# Patient Record
Sex: MALE | Race: BLACK OR AFRICAN AMERICAN
[De-identification: names, ages, dates, MRNs, and addresses within clinical notes are randomized per-mention and may not be internally consistent; named-entity substitution may affect disease eponyms.]

---

## 2018-04-01 ENCOUNTER — HOSPITAL ENCOUNTER (EMERGENCY)
Dept: HOSPITAL 97 - ER | Age: 23
Discharge: HOME | End: 2018-04-01
Payer: SELF-PAY

## 2018-04-01 DIAGNOSIS — J45.41: Primary | ICD-10-CM

## 2018-04-01 PROCEDURE — 99284 EMERGENCY DEPT VISIT MOD MDM: CPT

## 2018-04-01 PROCEDURE — 96374 THER/PROPH/DIAG INJ IV PUSH: CPT

## 2018-04-01 NOTE — ER
Nurse's Notes                                                                                     

 [facility]                                                                

Name: Miles Verma                                                                             

Age: 22 yrs                                                                                       

Sex: Male                                                                                         

: 1995                                                                                   

MRN: [id number]                                                                                   

Arrival Date: 2018                                                                          

Time: 22:16                                                                                       

Account#: [id number]                                                                            

Bed 8                                                                                             

Private MD:                                                                                       

Diagnosis: Moderate persistent asthma with (acute) exacerbation                                   

                                                                                                  

Presentation:                                                                                     

                                                                                             

22:20 Presenting complaint: EMS states: toned out for report of asthma attack. Transition of  bb  

      care: patient was not received from another setting of care. Onset of symptoms was          

      2018. Care prior to arrival: Medication(s) given: Albuterol Neb x 1, Atrovent     

      Neb x 1.                                                                                    

22:20 Method Of Arrival: EMS: [facility]                                                    bb  

22:20 Acuity: JANAE 2                                                                           bb  

                                                                                                  

Historical:                                                                                       

- Allergies:                                                                                      

22:21 No Known Allergies;                                                                     bb  

- PMHx:                                                                                           

22:21 Asthma;                                                                                 bb  

                                                                                                  

- Immunization history:: Adult Immunizations up to date.                                          

- Social history:: Smoking status: unknown.                                                       

                                                                                                  

                                                                                                  

Screenin:02 Abuse screen: Denies threats or abuse. Nutritional screening: No deficits noted.        ea  

      Tuberculosis screening: No symptoms or risk factors identified. Fall Risk None              

      identified.                                                                                 

                                                                                                  

Assessment:                                                                                       

22:33 General: Appears uncomfortable, Behavior is cooperative. Pain: Denies pain. Neuro:      ea  

      Level of Consciousness is awake, alert, obeys commands, Oriented to person, place,          

      time, situation. Cardiovascular: Heart tones present Patient's skin is warm and dry.        

      Respiratory: Airway is patent Respiratory effort is even, labored, Respiratory pattern      

      is regular, symmetrical. Respiratory: Breath sounds with wheezes bilaterally. GI: No        

      signs and/or symptoms were reported involving the gastrointestinal system. Derm: Skin       

      is pink, warm \T\ dry.                                                                      

23:04 Reassessment: Patient and/or family updated on plan of care and expected duration. Pain ea  

      level reassessed. Patient is alert, oriented x 3, equal unlabored respirations, skin        

      warm/dry/pink. Pt reports he is breathing easier.                                           

23:57 Reassessment: Patient and/or family updated on plan of care and expected duration. Pain ea  

      level reassessed. Patient is alert, oriented x 3, equal unlabored respirations, skin        

      warm/dry/pink. Discharge instructions given to patient, verbalized understanding of         

      instructions.                                                                               

                                                                                                  

Vital Signs:                                                                                      

22:33  / 81; Pulse 60; Resp 22; Temp 98.0(O); Pulse Ox 99% on R/A; Pain 0/10;           ea  

23:57  / 78; Pulse 62; Resp 18 S; Pulse Ox 99% on R/A;                                  ea  

                                                                                                  

ED Course:                                                                                        

22:16 Patient arrived in ED.                                                                  am2 

22:20 Triage completed.                                                                       maria fernanda  

22:21 Jayme Campbell PA is PHCP.                                                               jr8 

22:21 Jonathan Colon MD is Attending Physician.                                                    jr8 

22:21 Arm band placed on Patient placed in an exam room, on a stretcher, on pulse oximetry.   bb  

22:30 Inserted saline lock: 20 gauge in right forearm, using aseptic technique. Blood         ea  

      collected.                                                                                  

22:32 Brandy Lopez, MARCE is Primary Nurse.                                                    ea  

22:33 Patient has correct armband on for positive identification. Bed in low position. Call   ea  

      light in reach. Side rails up X 1.                                                          

23:58 No provider procedures requiring assistance completed. IV discontinued, intact,         ea  

      bleeding controlled, No redness/swelling at site. Pressure dressing applied.                

                                                                                                  

Administered Medications:                                                                         

22:32 Drug: Albuterol 2.5 mg Route: Inhalation;                                               ea  

22:33 Drug: SOLU-Medrol 125 mg Route: IVP; Site: right forearm;                               ea  

23:06 Follow up: Response: No adverse reaction                                                ea  

22:45 Drug: Albuterol 2.5 mg Route: Inhalation;                                               ea  

23:06 Drug: Albuterol 2.5 mg Route: Inhalation;                                               ea  

23:30 Follow up: Response: Marked relief of symptoms                                          ea  

                                                                                                  

                                                                                                  

Outcome:                                                                                          

23:40 Discharge ordered by MD.                                                                jrSherman 

23:58 Discharged to home ambulatory.                                                          ea  

23:58 Condition: improved                                                                         

23:58 Discharge instructions given to patient, Instructed on discharge instructions, follow       

      up and referral plans. medication usage, Demonstrated understanding of instructions,        

      follow-up care, medications, Prescriptions given X 2.                                       

23:59 Patient left the ED.                                                                    ea  

                                                                                                  

Signatures:                                                                                       

Mary Schuler RN RN bb Roszak, Josh, PA PA   jr8                                                  

Audrey Zepeda                               am2                                                  

Brandy Lopez RN RN ea                                                   

                                                                                                  

**************************************************************************************************

## 2018-04-01 NOTE — EDPHYS
Physician Documentation                                                                           

 [facility]                                                                

Name: Miles Verma                                                                             

Age: 22 yrs                                                                                       

Sex: Male                                                                                         

: 1995                                                                                   

MRN: [id number]                                                                                   

Arrival Date: 2018                                                                          

Time: 22:16                                                                                       

Account#: [id number]                                                                            

Bed 8                                                                                             

Private MD:                                                                                       

ED Physician Jonathan Colon                                                                             

HPI:                                                                                              

                                                                                             

22:28 This 22 yrs old Black Male presents to ER via EMS with complaints of Asthma             jr8 

      Exacerbation.                                                                               

22:28 The patient presents to the emergency department with wheezing, Current therapy:        jr8 

      albuterol inhaler. Onset: The symptoms/episode began/occurred acutely, today. Modifying     

      factors: The symptoms are alleviated by nothing, the symptoms are aggravated by             

      exertion, talking. Associated signs and symptoms: The patient has no apparent               

      associated signs or symptoms. Severity of symptoms: At their worst the symptoms were        

      moderate in the emergency department the symptoms are unchanged. The patient has            

      experienced similar episodes in the past, a few times. The patient has not recently         

      seen a physician. Patient stated that he has to use is inhaler about 2-3 times per day.     

      History of asthma. On no other medications. Denies recent illness. Was unable to            

      control shortness of breath with inhaler tonight .                                          

                                                                                                  

Historical:                                                                                       

- Allergies:                                                                                      

22:21 No Known Allergies;                                                                     bb  

- PMHx:                                                                                           

22:21 Asthma;                                                                                 bb  

                                                                                                  

- Immunization history:: Adult Immunizations up to date.                                          

- Social history:: Smoking status: unknown.                                                       

                                                                                                  

                                                                                                  

ROS:                                                                                              

22:28 Eyes: Negative for injury, pain, redness, and discharge, ENT: Negative for injury,      jr8 

      pain, and discharge, Neck: Negative for injury, pain, and swelling, Cardiovascular:         

      Negative for chest pain, palpitations, and edema, Abdomen/GI: Negative for abdominal        

      pain, nausea, vomiting, diarrhea, and constipation, Back: Negative for injury and pain,     

      MS/Extremity: Negative for injury and deformity, Skin: Negative for injury, rash, and       

      discoloration, Neuro: Negative for headache, weakness, numbness, tingling, and seizure.     

22:28 Respiratory: Positive for cough, shortness of breath, wheezing.                             

                                                                                                  

Exam:                                                                                             

22:28 Eyes:  Pupils equal round and reactive to light, extra-ocular motions intact.  Lids and jr8 

      lashes normal.  Conjunctiva and sclera are non-icteric and not injected.  Cornea within     

      normal limits.  Periorbital areas with no swelling, redness, or edema. ENT:  Nares          

      patent. No nasal discharge, no septal abnormalities noted.  Tympanic membranes are          

      normal and external auditory canals are clear.  Oropharynx with no redness, swelling,       

      or masses, exudates, or evidence of obstruction, uvula midline.  Mucous membranes           

      moist. Neck:  Trachea midline, no thyromegaly or masses palpated, and no cervical           

      lymphadenopathy.  Supple, full range of motion without nuchal rigidity, or vertebral        

      point tenderness.  No Meningismus. Cardiovascular:  Regular rate and rhythm with a          

      normal S1 and S2.  No gallops, murmurs, or rubs.  Normal PMI, no JVD.  No pulse             

      deficits. Abdomen/GI:  Soft, non-tender, with normal bowel sounds.  No distension or        

      tympany.  No guarding or rebound.  No evidence of tenderness throughout. Back:  No          

      spinal tenderness.  No costovertebral tenderness.  Full range of motion. Skin:  Warm,       

      dry with normal turgor.  Normal color with no rashes, no lesions, and no evidence of        

      cellulitis. MS/ Extremity:  Pulses equal, no cyanosis.  Neurovascular intact.  Full,        

      normal range of motion. Neuro:  Awake and alert, GCS 15, oriented to person, place,         

      time, and situation.  Cranial nerves II-XII grossly intact.  Motor strength 5/5 in all      

      extremities.  Sensory grossly intact.  Cerebellar exam normal.  Normal gait.                

22:28 Respiratory: mild respiratory distress is noted,  Respirations: tachypnea, that is          

      mild, Breath sounds: wheezing: expiratory that is moderate, is heard diffusely.             

                                                                                                  

Vital Signs:                                                                                      

22:33  / 81; Pulse 60; Resp 22; Temp 98.0(O); Pulse Ox 99% on R/A; Pain 0/10;           ea  

23:57  / 78; Pulse 62; Resp 18 S; Pulse Ox 99% on R/A;                                  ea  

                                                                                                  

MDM:                                                                                              

22:21 Patient medically screened.                                                             8 

23:40 Data reviewed: vital signs, nurses notes, and as a result, I will discharge patient.    jr8 

      Data interpreted: Pulse oximetry: on room air is 99 %. Interpretation: normal.              

      Counseling: I had a detailed discussion with the patient and/or guardian regarding: the     

      historical points, exam findings, and any diagnostic results supporting the                 

      discharge/admit diagnosis, the need for outpatient follow up, a family practitioner, to     

      return to the emergency department if symptoms worsen or persist or if there are any        

      questions or concerns that arise at home. Response to treatment: the patient's symptoms     

      have resolved after treatment.                                                              

                                                                                                  

                                                                                             

22:21 Order name: IV; Complete Time: 22:33                                                    jr8 

                                                                                                  

Administered Medications:                                                                         

22:32 Drug: Albuterol 2.5 mg Route: Inhalation;                                               ea  

22:33 Drug: SOLU-Medrol 125 mg Route: IVP; Site: right forearm;                               ea  

23:06 Follow up: Response: No adverse reaction                                                ea  

22:45 Drug: Albuterol 2.5 mg Route: Inhalation;                                               ea  

23:06 Drug: Albuterol 2.5 mg Route: Inhalation;                                               ea  

23:30 Follow up: Response: Marked relief of symptoms                                          gera  

                                                                                                  

                                                                                                  

Disposition:                                                                                      

                                                                                             

00:20 Co-signature as Attending Physician, Jonathan Colon MD.                                        tyson 

                                                                                                  

Disposition:                                                                                      

18 23:40 Discharged to Home. Impression: Moderate persistent asthma with (acute)            

  exacerbation.                                                                                   

- Condition is Stable.                                                                            

- Discharge Instructions: Asthma, Adult.                                                          

- Prescriptions for Prednisone 20 mg Oral Tablet - take 1 tablet by ORAL route once               

  daily for 5 days; 5 tablet. Albuterol Sulfate 90 mcg/actuation - inhale 1-2 puff by             

  INHALATION route every 4-6 hours; 1 Inhaler.                                                    

- Medication Reconciliation Form, Thank You Letter, Antibiotic Education, Prescription            

  Opioid Use form.                                                                                

- Follow up: Private Physician; When: 5 - 6 days; Reason: Recheck today's complaints,             

  Continuance of care, Re-evaluation by your physician.                                           

- Problem is new.                                                                                 

- Symptoms have improved.                                                                         

                                                                                                  

                                                                                                  

                                                                                                  

Signatures:                                                                                       

Jonathan Colon MD MD pkl Ballard, Brenda, RN RN bb Roszak, Josh, PA PA   jr8                                                  

Brandy Lopez RN RN ea                                                   

                                                                                                  

**************************************************************************************************

## 2018-04-17 ENCOUNTER — HOSPITAL ENCOUNTER (EMERGENCY)
Dept: HOSPITAL 97 - ER | Age: 23
Discharge: HOME | End: 2018-04-17
Payer: SELF-PAY

## 2018-04-17 DIAGNOSIS — J45.909: ICD-10-CM

## 2018-04-17 DIAGNOSIS — K22.6: Primary | ICD-10-CM

## 2018-04-17 LAB
ALBUMIN SERPL BCP-MCNC: 4.2 G/DL (ref 3.2–5.5)
ALP SERPL-CCNC: 161 IU/L (ref 42–121)
ALT SERPL W P-5'-P-CCNC: 73 IU/L (ref 10–60)
AST SERPL W P-5'-P-CCNC: 46 IU/L (ref 10–42)
BUN BLD-MCNC: 19 MG/DL (ref 6–20)
GLUCOSE SERPLBLD-MCNC: 119 MG/DL (ref 65–120)
HCT VFR BLD CALC: 43.4 % (ref 39.6–49)
LYMPHOCYTES # SPEC AUTO: 1.3 K/UL (ref 0.7–4.9)
MCH RBC QN AUTO: 30.6 PG (ref 27–35)
MCV RBC: 88.4 FL (ref 80–100)
PMV BLD: 9.9 FL (ref 7.6–11.3)
POTASSIUM SERPL-SCNC: 3.6 MEQ/L (ref 3.6–5)
RBC # BLD: 4.91 M/UL (ref 4.33–5.43)

## 2018-04-17 PROCEDURE — 99284 EMERGENCY DEPT VISIT MOD MDM: CPT

## 2018-04-17 PROCEDURE — 80053 COMPREHEN METABOLIC PANEL: CPT

## 2018-04-17 PROCEDURE — 74022 RADEX COMPL AQT ABD SERIES: CPT

## 2018-04-17 PROCEDURE — 36415 COLL VENOUS BLD VENIPUNCTURE: CPT

## 2018-04-17 PROCEDURE — 85025 COMPLETE CBC W/AUTO DIFF WBC: CPT

## 2018-04-17 NOTE — EDPHYS
Physician Documentation                                                                           

 [facility]                                                                

Name: Miles Verma                                                                             

Age: 22 yrs                                                                                       

Sex: Male                                                                                         

: 1995                                                                                   

MRN: [id number]                                                                                   

Arrival Date: 2018                                                                          

Time: 08:49                                                                                       

Account#: [id number]                                                                            

Bed 5                                                                                             

Private MD:                                                                                       

ED Physician Gerald Dawson                                                                      

HPI:                                                                                              

                                                                                             

09:02 This 22 yrs old Black Male presents to ER via Unassigned with complaints of Vomiting.   ps1 

09:02 The patient presents to the emergency department with nausea, vomiting, that is         ps1 

      intermittent, described as blood streaked. Onset: The symptoms/episode began/occurred 2     

      week(s) ago. Possible causes: unknown. The symptoms are aggravated by nothing. The          

      symptoms are alleviated by nothing. Associated signs and symptoms: The patient has no       

      apparent associated signs or symptoms. Severity of symptoms: At their worst the             

      symptoms were moderate in the emergency department the symptoms have resolved. The          

      patient has experienced a previous episode. The patient has been recently seen at the       

      [facility] Emergency Department, for similar complaints. hx of       

      asthma, treated with albuterol. .                                                           

                                                                                                  

Historical:                                                                                       

- Allergies:                                                                                      

09:03 NKA;                                                                                    iw  

- Home Meds:                                                                                      

09:02 Albuterol Inhl [Active];                                                                iw  

- PMHx:                                                                                           

09:02 Asthma;                                                                                 iw  

- PSHx:                                                                                           

09:03 None;                                                                                   iw  

                                                                                                  

- Immunization history:: Adult Immunizations.                                                     

- Social history:: Smoking status: .                                                              

                                                                                                  

                                                                                                  

ROS:                                                                                              

09:02 Constitutional: Negative for fever, chills, and weight loss, Eyes: Negative for injury, ps1 

      pain, redness, and discharge, Neck: Negative for injury, pain, and swelling,                

      Cardiovascular: Negative for chest pain, palpitations, and edema.                           

09:02 Back: Negative for injury and pain, MS/Extremity: Negative for injury and deformity,        

      Skin: Negative for injury, rash, and discoloration, Neuro: Negative for headache,           

      weakness, numbness, tingling, and seizure.                                                  

09:02 Respiratory: Positive for intermittent asthma exacerbation. .                               

09:02 Abdomen/GI: Positive for nausea, vomiting.                                                  

                                                                                                  

Exam:                                                                                             

09:02 Constitutional:  This is a well developed, well nourished patient who is awake, alert,  ps1 

      and in no acute distress. Head/Face:  Normocephalic, atraumatic. Eyes:  Pupils equal        

      round and reactive to light, extra-ocular motions intact.  Lids and lashes normal.          

      Conjunctiva and sclera are non-icteric and not injected. Neck:  Trachea midline, no         

      thyromegaly or masses palpated, and no cervical lymphadenopathy.  Supple, full range of     

      motion without nuchal rigidity, or vertebral point tenderness.  No Meningismus.             

      Chest/axilla:  Normal chest wall appearance and motion.  Nontender with no deformity.       

      No lesions are appreciated. Cardiovascular:  Regular rate and rhythm.  No gallops,          

      murmurs, or rubs.  Normal PMI, no JVD.  No pulse deficits. Respiratory:  Lungs have         

      equal breath sounds bilaterally, clear to auscultation and percussion.  No rales,           

      rhonchi or wheezes noted.  No increased work of breathing, no retractions or nasal          

      flaring. Abdomen/GI:  Soft, non-tender, with normal bowel sounds.  No distension or         

      tympany.  No guarding or rebound.  No evidence of tenderness throughout. MS/ Extremity:     

       Pulses equal, no cyanosis.  Neurovascular intact.  Full, normal range of motion.           

      Neuro:  Awake and alert, GCS 15, oriented to person, place, time, and situation.            

      Cranial nerves II-XII grossly intact. Sensory grossly intact. Psych:  Awake, alert,         

      with orientation to person, place and time.  Behavior, mood, and affect are within          

      normal limits.                                                                              

                                                                                                  

Vital Signs:                                                                                      

09:03  / 76; Pulse 84; Resp 16; Temp 98.2; Pulse Ox 97% on R/A;                         iw  

10:19  / 72; Pulse 72; Resp 16; Pulse Ox 98% on R/A; Pain 0/10;                         sg  

                                                                                                  

MDM:                                                                                              

09:02 Data reviewed: vital signs, nurses notes.                                               ps1 

09:22 Patient medically screened.                                                             ps1 

                                                                                                  

                                                                                             

09:07 Order name: CBC with Diff; Complete Time: 10:13                                         ps1 

                                                                                             

09:07 Order name: CMP; Complete Time: 10:19                                                   ps1 

                                                                                             

09:07 Order name: Abdomen Acute Series XRAY; Complete Time: 10:13                             ps1 

                                                                                                  

Administered Medications:                                                                         

09:20 Drug: Zofran 4 mg Route: PO;                                                            sg  

11:02 Follow up: Response: No adverse reaction                                                iw  

09:20 Drug: GI Cocktail without Donnatal - (Maalox Suspension 30 ml, Lidocaine Liquid 2 % 15  sg  

      ml) Route: PO;                                                                              

11:02 Follow up: Response: No adverse reaction                                                iw  

09:20 Drug: Bentyl 20 mg Route: PO;                                                             

11:01 Follow up: Response: No adverse reaction                                                iw  

                                                                                                  

                                                                                                  

Disposition:                                                                                      

18 10:21 Discharged to Home. Impression: Nausea and vomiting, Shara Thomson Tear.           

- Condition is Stable.                                                                            

- Discharge Instructions: Nausea and Vomiting, Easy-to-Read.                                      

- Prescriptions for Bentyl 10 mg Oral Capsule - take 1 capsule by ORAL route every 6              

  hours As needed; 40 capsule. Carafate 1 gram Oral Tablet - take 1 tablet by ORAL                

  route 4 times per day take on an empty stomach, beginning on waking and last dose at            

  bedtime; 100 tablet. Zofran 4 mg Oral Tablet - take 1 tablet by ORAL route every 12             

  hours As needed; 20 tablet.                                                                     

- Medication Reconciliation Form, Thank You Letter, Antibiotic Education, Prescription            

  Opioid Use form.                                                                                

- Follow up: Private Physician; When: As needed; Reason: Recheck today's complaints,              

  Re-evaluation by your physician. Follow up: Emergency Department; When: As needed;              

  Reason: Worsening of condition.                                                                 

- Problem is an ongoing problem.                                                                  

- Symptoms have improved.                                                                         

                                                                                                  

                                                                                                  

                                                                                                  

Signatures:                                                                                       

Dispatcher [employer]                           Jose F Salguero, RN                         Court Penn RN                     MARCE                                                      

Gerald Dawson MD MD   ps1                                                  

                                                                                                  

**************************************************************************************************

## 2018-04-17 NOTE — ER
Nurse's Notes                                                                                     

 [facility]                                                                

Name: Miles Verma                                                                             

Age: 22 yrs                                                                                       

Sex: Male                                                                                         

: 1995                                                                                   

MRN: [id number]                                                                                   

Arrival Date: 2018                                                                          

Time: 08:49                                                                                       

Account#: [id number]                                                                            

Bed 5                                                                                             

Private MD:                                                                                       

Diagnosis: Nausea and vomiting;Shara Thomson Tear                                                 

                                                                                                  

Presentation:                                                                                     

                                                                                             

09:01 Presenting complaint: Patient states: has been vomiting "red and yellow stuff" for a    iw  

      week. Transition of care: patient was not received from another setting of care. Onset      

      of symptoms was April 10, 2018. Initial Sepsis Screen: Does the patient meet any 2          

      criteria? No. Patient's initial sepsis screen is negative. Does the patient have a          

      suspected source of infection? No. Patient initial sepsis screen negative. Care prior       

      to arrival: None.                                                                           

09:01 Method Of Arrival: Ambulatory                                                           iw  

09: Acuity: JANAE 3                                                                           iw  

                                                                                                  

Historical:                                                                                       

- Allergies:                                                                                      

09:03 NKA;                                                                                    iw  

- Home Meds:                                                                                      

09:02 Albuterol Inhl [Active];                                                                iw  

- PMHx:                                                                                           

09:02 Asthma;                                                                                 iw  

- PSHx:                                                                                           

09:03 None;                                                                                   iw  

                                                                                                  

- Immunization history:: Adult Immunizations.                                                     

- Social history:: Smoking status: .                                                              

                                                                                                  

                                                                                                  

Screenin:15 Abuse screen: Denies threats or abuse. Denies injuries from another. Nutritional        sg  

      screening: No deficits noted. Tuberculosis screening: No symptoms or risk factors           

      identified. Never had TB. Fall Risk None identified.                                        

                                                                                                  

Assessment:                                                                                       

09:15 General: Appears in no apparent distress. comfortable, slender, well groomed, well      sg  

      developed, well nourished, Behavior is calm, cooperative, appropriate for age. Pain:        

      Denies pain. Neuro: No deficits noted. Cardiovascular: Heart tones S1 S2 present            

      Capillary refill is brisk in bilateral fingers Patient's skin is warm and dry. Chest        

      pain is denied. Respiratory: Airway is patent Respiratory effort is even, unlabored,        

      Respiratory pattern is regular, symmetrical, Breath sounds are clear bilaterally. GI:       

      Abdomen is flat, non-distended, Bowel sounds present X 4 quads. Abd is soft and non         

      tender X 4 quads. Reports nausea. : No signs and/or symptoms were reported regarding      

      the genitourinary system. EENT: No signs and/or symptoms were reported regarding the        

      EENT system. Derm: Skin is intact, is healthy with good turgor, Skin is dry, Skin is        

      normal, Skin temperature is warm. Musculoskeletal: No signs and/or symptoms reported        

      regarding the musculoskeletal system.                                                       

10:21 Reassessment: Patient appears in no apparent distress at this time. Patient and/or      sg  

      family updated on plan of care and expected duration. Pain level reassessed. Patient is     

      alert, oriented x 3, equal unlabored respirations, skin warm/dry/pink. pt requesting OJ     

      at this time, awaiting re evaluation by the provider prior to administering anything        

      PO, pt stated understanding, will continue to monitor.                                      

11:01 Reassessment: Patient appears in no apparent distress at this time. Patient and/or      iw  

      family updated on plan of care and expected duration. Pain level reassessed.                

                                                                                                  

Vital Signs:                                                                                      

09:03  / 76; Pulse 84; Resp 16; Temp 98.2; Pulse Ox 97% on R/A;                         iw  

10:19  / 72; Pulse 72; Resp 16; Pulse Ox 98% on R/A; Pain 0/10;                         sg  

                                                                                                  

ED Course:                                                                                        

08:49 Patient arrived in ED.                                                                  as  

08:54 Gerald Dawson MD is Attending Physician.                                             ps1 

09:02 Triage completed.                                                                       iw  

09:03 Arm band placed on.                                                                     iw  

09:14 Patient has correct armband on for positive identification. Placed in gown. Bed in low  sg  

      position. Call light in reach. Side rails up X2. Pulse ox on. NIBP on. Head of bed          

      elevated.                                                                                   

09:19 Jose F Tipton, RN is Primary Nurse.                                                       sg  

09:22 CMP Sent.                                                                               ag  

09:22 CBC with Diff Sent.                                                                     ag  

09:22 Initial lab(s) drawn, by me, sent to lab. Inserted saline lock: 20 gauge in right       ag  

      antecubital area, using aseptic technique. Blood collected.                                 

09:29 Patient moved to radiology via wheelchair.                                              1 

09:29 Abdomen Acute Series XRAY In Process Unspecified.                                       EDMS

11:01 No provider procedures requiring assistance completed. IV discontinued, intact,         iw  

      bleeding controlled, No redness/swelling at site. Pressure dressing applied.                

                                                                                                  

Administered Medications:                                                                         

09:20 Drug: Zofran 4 mg Route: PO;                                                            sg  

11:02 Follow up: Response: No adverse reaction                                                iw  

09:20 Drug: GI Cocktail without Donnatal - (Maalox Suspension 30 ml, Lidocaine Liquid 2 % 15  sg  

      ml) Route: PO;                                                                              

11:02 Follow up: Response: No adverse reaction                                                iw  

09:20 Drug: Bentyl 20 mg Route: PO;                                                             

11:01 Follow up: Response: No adverse reaction                                                iw  

                                                                                                  

                                                                                                  

Outcome:                                                                                          

10:21 Discharge ordered by MD.                                                                ps1 

11:01 Discharged to home ambulatory.                                                          iw  

11:01 Condition: good                                                                             

11:01 Discharge instructions given to patient, Instructed on discharge instructions, follow       

      up and referral plans. medication usage, Demonstrated understanding of instructions,        

      follow-up care, medications, Prescriptions given X 3.                                       

11:02 Patient left the ED.                                                                    iw  

                                                                                                  

Signatures:                                                                                       

Dispatcher [employer]                           EDJose F Pozo, RN                         RN                                                      

Jadyn Lopez                               1                                                  

Damaris Wetzel Irene, RN                     RN                                                      

Buddy, Gerald Coppola MD MD   ps1                                                  

                                                                                                  

**************************************************************************************************
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
## 2018-05-15 ENCOUNTER — HOSPITAL ENCOUNTER (EMERGENCY)
Dept: HOSPITAL 97 - ER | Age: 23
Discharge: HOME | End: 2018-05-15
Payer: SELF-PAY

## 2018-05-15 DIAGNOSIS — E87.6: ICD-10-CM

## 2018-05-15 DIAGNOSIS — J45.51: Primary | ICD-10-CM

## 2018-05-15 LAB
BUN BLD-MCNC: 18 MG/DL (ref 6–20)
GLUCOSE SERPLBLD-MCNC: 115 MG/DL (ref 65–120)
HCT VFR BLD CALC: 39.2 % (ref 39.6–49)
LYMPHOCYTES # SPEC AUTO: 2.4 K/UL (ref 0.7–4.9)
MCH RBC QN AUTO: 31 PG (ref 27–35)
MCV RBC: 88.9 FL (ref 80–100)
PMV BLD: 10 FL (ref 7.6–11.3)
POTASSIUM SERPL-SCNC: 2.7 MEQ/L (ref 3.6–5)
RBC # BLD: 4.41 M/UL (ref 4.33–5.43)

## 2018-05-15 PROCEDURE — 99285 EMERGENCY DEPT VISIT HI MDM: CPT

## 2018-05-15 PROCEDURE — 80048 BASIC METABOLIC PNL TOTAL CA: CPT

## 2018-05-15 PROCEDURE — 85025 COMPLETE CBC W/AUTO DIFF WBC: CPT

## 2018-05-15 PROCEDURE — 84132 ASSAY OF SERUM POTASSIUM: CPT

## 2018-05-15 PROCEDURE — 96375 TX/PRO/DX INJ NEW DRUG ADDON: CPT

## 2018-05-15 PROCEDURE — 96374 THER/PROPH/DIAG INJ IV PUSH: CPT

## 2018-05-15 PROCEDURE — 71046 X-RAY EXAM CHEST 2 VIEWS: CPT

## 2018-05-15 PROCEDURE — 36415 COLL VENOUS BLD VENIPUNCTURE: CPT

## 2018-05-15 NOTE — ER
Nurse's Notes                                                                                     

 [facility]                                                                

Name: Miles Verma                                                                             

Age: 22 yrs                                                                                       

Sex: Male                                                                                         

: 1995                                                                                   

MRN: [id number]                                                                                   

Arrival Date: 05/15/2018                                                                          

Time: 00:31                                                                                       

Account#: [id number]                                                                            

Bed 5                                                                                             

Private MD:                                                                                       

Diagnosis: Severe persistent asthma with (acute) exacerbation;Hypokalemia                         

                                                                                                  

Presentation:                                                                                     

05/15                                                                                             

00:34 Presenting complaint: EMS states: Patient was walking home when he became short of      lp1 

      breath, states hx of asthma, ran out of nebulizer treatments yesterday, unsure of           

      medication. Transition of care: patient was not received from another setting of care.      

      Onset of symptoms was May 15, 2018. Initial Sepsis Screen: Does the patient meet any 2      

      criteria? No. Patient's initial sepsis screen is negative. Does the patient have a          

      suspected source of infection? No. Patient's initial sepsis screen is negative. Care        

      prior to arrival: Medication(s) given: Albuterol Neb x 1, Atrovent Neb x 1.                 

00:34 Method Of Arrival: EMS: [facility]                                                       lp1 

00:34 Acuity: JANAE 3                                                                           lp1 

                                                                                                  

Triage Assessment:                                                                                

00:38 General: Appears in no apparent distress. Behavior is calm. Pain: Denies pain.          lp1 

      Respiratory: Reports shortness of breath Airway is patent Respiratory effort is even,       

      Breath sounds with wheezes Onset: The symptoms/episode began/occurred just prior to         

      arrival, the patient has mild shortness of breath.                                          

                                                                                                  

Historical:                                                                                       

- Allergies:                                                                                      

00:38 NKA;                                                                                    lp1 

- Home Meds:                                                                                      

00:38 Albuterol Inhl [Active];                                                                lp1 

- PMHx:                                                                                           

00:38 Asthma;                                                                                 lp1 

- PSHx:                                                                                           

00:38 None;                                                                                   lp1 

                                                                                                  

- Immunization history:: Adult Immunizations up to date.                                          

- Social history:: Smoking status: Patient/guardian denies using tobacco.                         

                                                                                                  

                                                                                                  

Screenin:38 Abuse screen: Denies threats or abuse. Denies injuries from another. Nutritional        lp1 

      screening: No deficits noted. Tuberculosis screening: No symptoms or risk factors           

      identified. Fall Risk None identified.                                                      

                                                                                                  

Assessment:                                                                                       

00:46 General: Appears with mild distress. Behavior is calm, cooperative. Pain: Denies pain.  mg2 

      Neuro: Level of Consciousness is awake, alert, obeys commands, Oriented to person,          

      place, time. Cardiovascular: Capillary refill < 3 seconds Patient's skin is warm and        

      dry. Respiratory: Airway is patent Respiratory effort is even, wheezy Respiratory           

      pattern is regular, symmetrical. GI: No signs and/or symptoms were reported involving       

      the gastrointestinal system. : No signs and/or symptoms were reported regarding the       

      genitourinary system. EENT: No signs and/or symptoms were reported regarding the EENT       

      system. Derm: Skin is intact, Skin is pink, warm \T\ dry. normal. Musculoskeletal:          

      Circulation, motion, and sensation intact.                                                  

00:49 Reassessment: patient sent for cxray.                                                   mg2 

01:50 Reassessment: Patient appears in no apparent distress at this time. Patient and/or      mg2 

      family updated on plan of care and expected duration. Pain level reassessed. Patient is     

      alert, oriented x 3, equal unlabored respirations, skin warm/dry/pink. critical result      

      of K-2.7 mmol relayed to the provider.                                                      

02:19 Reassessment: Patient appears in no apparent distress at this time. Patient and/or      mg2 

      family updated on plan of care and expected duration. Pain level reassessed. Patient is     

      alert, oriented x 3, equal unlabored respirations, skin warm/dry/pink.                      

04:08 Reassessment: Patient appears in no apparent distress at this time. Patient and/or      mg2 

      family updated on plan of care and expected duration. Pain level reassessed. Patient is     

      alert, oriented x 3, equal unlabored respirations, skin warm/dry/pink.                      

                                                                                                  

Vital Signs:                                                                                      

00:36  / 79; Pulse 75; Resp 18; Temp 97.9(O); Pulse Ox 100% on R/A; Weight 74.84 kg;    lp1 

      Height 5 ft. 7 in. (170.18 cm); Pain 0/10;                                                  

01:51  / 78; Pulse 72; Resp 20; Pulse Ox 100% on R/A; Pain 0/10;                        mg2 

02:18  / 72; Pulse 77; Resp 18; Pulse Ox 99% ;                                          ea  

03:03  / 70; Pulse 72; Resp 18; Pulse Ox 98% ; Pain 0/10;                               mg2 

04:07  / 73; Pulse 62; Resp 18; Pulse Ox 99% ;                                          mg2 

05:03  / 81; Pulse 53; Resp 18; Pulse Ox 100% on R/A; Pain 0/10;                        mg2 

00:36 Body Mass Index 25.84 (74.84 kg, 170.18 cm)                                             lp1 

05:03 patient sleeping                                                                        mg2 

                                                                                                  

ED Course:                                                                                        

00:31 Patient arrived in ED.                                                                  mg2 

00:31 [facility]                                                        snw 

00:31 Rebel Armando MD is Attending Physician.                                            snw 

00:35 Sal Law, RN is Primary Nurse.                                                  mg2 

00:36 Triage completed.                                                                       lp1 

00:36 Arm band placed on left wrist.                                                          lp1 

00:39 Patient has correct armband on for positive identification. Pulse ox on. NIBP on.       lp1 

00:48 Inserted saline lock: 20 gauge in right antecubital area, using aseptic technique.      mg2 

      Blood collected.                                                                            

00:54 Chest Pa And Lat (2 Views) XRAY In Process Unspecified.                                 EDMS

05:17 No provider procedures requiring assistance completed. intact, bleeding controlled, No  mg2 

      redness/swelling at site. Pressure dressing applied.                                        

                                                                                                  

Administered Medications:                                                                         

00:46 Drug: SOLU-Medrol 125 mg Route: IVP; Site: right antecubital;                           mg2 

01:49 Follow up: Response: No adverse reaction; Other; breathing improved                     mg2 

00:52 Drug: NS 0.9% 1000 ml Route: IV; Rate: 125 ml/hr; Site: right antecubital;              mg2 

00:53 Drug: Albuterol 2.5 mg Route: Inhalation;                                               mg2 

00:53 Drug: AtroVENT Aerosol 0.5 mg Route: Inhalation;                                        mg2 

02:18 Drug: Potassium Chloride 20 mEq Route: IV; Rate: calculated rate; Site: right           mg2 

      antecubital;                                                                                

02:18 Drug: Potassium Chloride 40 mEq Route: PO;                                              mg2 

04:06 Follow up: Response: No adverse reaction                                                mg2 

02:18 Drug: Magnesium Sulfate 1 grams Route: IVPB; Infused Over: 1 hrs; Site: right           mg2 

      antecubital;                                                                                

04:59 Drug: Potassium Effervescent Tablet 50 mEq Route: PO;                                   mg2 

05:11 Follow up: Response: No adverse reaction; Medication administered at discharge.         mg2 

                                                                                                  

                                                                                                  

Intake:                                                                                           

                                                                                                  

Outcome:                                                                                          

04:54 Discharge ordered by MD.                                                                snw 

05:17 Discharged to home ambulatory.                                                          mg2 

05:17 Condition: stable                                                                           

05:17 Discharge instructions given to patient, Instructed on discharge instructions, follow       

      up and referral plans. Demonstrated understanding of instructions, follow-up care,          

      medications, Prescriptions given X 3.                                                       

05:17 Patient left the ED.                                                                    mg2 

                                                                                                  

Signatures:                                                                                       

Dispatcher [employer]                           EDMS                                                 

Katlyn Hernandez, FNP-C                 FNP-Csnw                                                  

Kelsey Juarez RN                         RN   lp1                                                  

Brandy Lopez RN                      MARCE   ea                                                   

Sal Law RN RN   mg2                                                  

                                                                                                  

Corrections: (The following items were deleted from the chart)                                    

00:34 00:31 Presenting complaint: EMS states: Shortness of breath began as patient was        mg2 

      walking home, states hx of asthma, ran out of nebulizer treatments yesterday, unsure mg2    

05:05 05:03  / 81; Pulse 53bpm; Resp 18bpm; Pulse Ox 100% RA; Pain 0/10; mg2            mg2 

                                                                                                  

**************************************************************************************************

## 2018-05-15 NOTE — RAD REPORT
EXAM DESCRIPTION:  RAD - Chest Pa And Lat (2 Views) - 5/15/2018 12:54 am

 

CLINICAL HISTORY:  Shortness of breath, asthma history

 

COMPARISON:  April 17, March 13

 

TECHNIQUE:  PA and lateral views of the chest were obtained.

 

FINDINGS:  The lungs are clear.  No peribronchial thickening. Trachea is midline. No air trapping fin
dings. Heart size is normal and central vasculature is within normal limits.  No pleural effusion or 
pneumothorax seen.  No acute bone finding. There is wedging of a vertebrae near the thoracolumbar fiordaliza
ction that is similar to the comparison. No aortic abnormality.  No significant interval changes note
d.

 

IMPRESSION:  No acute cardiopulmonary process.

## 2018-05-15 NOTE — EDPHYS
Physician Documentation                                                                           

 [facility]                                                                

Name: Miles Verma                                                                             

Age: 22 yrs                                                                                       

Sex: Male                                                                                         

: 1995                                                                                   

MRN: [id number]                                                                                   

Arrival Date: 05/15/2018                                                                          

Time: 00:31                                                                                       

Account#: [id number]                                                                            

Bed 5                                                                                             

Private MD:                                                                                       

ED Physician Rebel Armando                                                                     

HPI:                                                                                              

05/15                                                                                             

00:42 This 22 yrs old Black Male presents to ER via EMS with complaints of Shortness Of       snw 

      Breath.                                                                                     

00:42 The patient has shortness of breath at rest. Onset: The symptoms/episode began/occurred snw 

      suddenly. Duration: The symptoms are continuous. The patient's shortness of breath is       

      aggravated by light activity. Associated signs and symptoms: Pertinent positives:           

      non-productive cough. Severity of symptoms: At their worst the symptoms were moderate       

      in the emergency department the symptoms are unchanged. The patient has experienced         

      similar episodes in the past, shortness of breath, asthma exacerbations twice weekly.       

      The patient has not recently seen a physician. ran out of inhaler.                          

                                                                                                  

Historical:                                                                                       

- Allergies:                                                                                      

00:38 NKA;                                                                                    lp1 

- Home Meds:                                                                                      

00:38 Albuterol Inhl [Active];                                                                lp1 

- PMHx:                                                                                           

00:38 Asthma;                                                                                 lp1 

- PSHx:                                                                                           

00:38 None;                                                                                   lp1 

                                                                                                  

- Immunization history:: Adult Immunizations up to date.                                          

- Social history:: Smoking status: Patient/guardian denies using tobacco.                         

                                                                                                  

                                                                                                  

ROS:                                                                                              

00:41 Constitutional: Negative for fever, chills, and weight loss, Eyes: Negative for injury, snw 

      pain, redness, and discharge, ENT: Negative for injury, pain, and discharge, Neck:          

      Negative for injury, pain, and swelling, Cardiovascular: Negative for chest pain,           

      palpitations, and edema, Abdomen/GI: Negative for abdominal pain, nausea, vomiting,         

      diarrhea, and constipation, Back: Negative for injury and pain, : Negative for            

      injury, bleeding, discharge, and swelling, MS/Extremity: Negative for injury and            

      deformity, Skin: Negative for injury, rash, and discoloration, Neuro: Negative for          

      headache, weakness, numbness, tingling, and seizure.                                        

00:41 Respiratory: Positive for cough, shortness of breath, at rest. wheezing.                    

                                                                                                  

Exam:                                                                                             

00:41 Constitutional:  This is a well developed, well nourished patient who is awake, alert,  snw 

      and in no acute distress. Head/Face:  Normocephalic, atraumatic. Eyes:  Pupils equal        

      round and reactive to light, extra-ocular motions intact.  Lids and lashes normal.          

      Conjunctiva and sclera are non-icteric and not injected.  Cornea within normal limits.      

      Periorbital areas with no swelling, redness, or edema. ENT:  Nares patent. No nasal         

      discharge, no septal abnormalities noted.  Tympanic membranes are normal and external       

      auditory canals are clear.  Oropharynx with no redness, swelling, or masses, exudates,      

      or evidence of obstruction, uvula midline.  Mucous membranes moist. Neck:  Trachea          

      midline, no thyromegaly or masses palpated, and no cervical lymphadenopathy.  Supple,       

      full range of motion without nuchal rigidity, or vertebral point tenderness.  No            

      Meningismus. Chest/axilla:  Normal chest wall appearance and motion.  Nontender with no     

      deformity.  No lesions are appreciated. Cardiovascular:  Regular rate and rhythm with a     

      normal S1 and S2.  No gallops, murmurs, or rubs.  Normal PMI, no JVD.  No pulse             

      deficits. Abdomen/GI:  Soft, non-tender, with normal bowel sounds.  No distension or        

      tympany.  No guarding or rebound.  No evidence of tenderness throughout. Back:  No          

      spinal tenderness.  No costovertebral tenderness.  Full range of motion. Skin:  Warm,       

      dry with normal turgor.  Normal color with no rashes, no lesions, and no evidence of        

      cellulitis. MS/ Extremity:  Pulses equal, no cyanosis.  Neurovascular intact.  Full,        

      normal range of motion. Neuro:  Awake and alert, GCS 15, oriented to person, place,         

      time, and situation.  Cranial nerves II-XII grossly intact.  Motor strength 5/5 in all      

      extremities.  Sensory grossly intact.  Cerebellar exam normal.  Normal gait.                

00:41 Respiratory: the patient does not display signs of respiratory distress,  Respirations:     

      prolonged exhalation, intercostal retractions, shallow respirations, tachypnea, Breath      

      sounds: wheezing: that is moderate, is heard diffusely.                                     

                                                                                                  

Vital Signs:                                                                                      

00:36  / 79; Pulse 75; Resp 18; Temp 97.9(O); Pulse Ox 100% on R/A; Weight 74.84 kg;    lp1 

      Height 5 ft. 7 in. (170.18 cm); Pain 0/10;                                                  

01:51  / 78; Pulse 72; Resp 20; Pulse Ox 100% on R/A; Pain 0/10;                        mg2 

02:18  / 72; Pulse 77; Resp 18; Pulse Ox 99% ;                                          ea  

03:03  / 70; Pulse 72; Resp 18; Pulse Ox 98% ; Pain 0/10;                               mg2 

04:07  / 73; Pulse 62; Resp 18; Pulse Ox 99% ;                                          mg2 

05:03  / 81; Pulse 53; Resp 18; Pulse Ox 100% on R/A; Pain 0/10;                        mg2 

00:36 Body Mass Index 25.84 (74.84 kg, 170.18 cm)                                             lp1 

05:03 patient sleeping                                                                        mg2 

                                                                                                  

MDM:                                                                                              

00:41 Patient medically screened.                                                             snw 

04:55 Data reviewed: vital signs, nurses notes. Data interpreted: Pulse oximetry: on room air snw 

      is 99 %. Interpretation: normal. Counseling: I had a detailed discussion with the           

      patient and/or guardian regarding: the historical points, exam findings, and any            

      diagnostic results supporting the discharge/admit diagnosis, lab results, radiology         

      results, the need for outpatient follow up, to return to the emergency department if        

      symptoms worsen or persist or if there are any questions or concerns that arise at          

      home. Special discussion: Based on the history and exam findings, there is no               

      indication for further emergent testing or inpatient evaluation. I discussed with the       

      patient/guardian the need to see the primary care provider for further evaluation of        

      the symptoms.                                                                               

                                                                                                  

05/15                                                                                             

00:34 Order name: CBC with Diff; Complete Time: 01:21                                         snw 

05/15                                                                                             

00:34 Order name: Chem 7; Complete Time: 01:49                                                snw 

05/15                                                                                             

00:34 Order name: Chest Pa And Lat (2 Views) XRAY                                             snw 

05/15                                                                                             

02:58 Order name: Potassium; Complete Time: 04:53                                             snw 

                                                                                                  

Administered Medications:                                                                         

00:46 Drug: SOLU-Medrol 125 mg Route: IVP; Site: right antecubital;                           mg2 

01:49 Follow up: Response: No adverse reaction; Other; breathing improved                     mg2 

00:52 Drug: NS 0.9% 1000 ml Route: IV; Rate: 125 ml/hr; Site: right antecubital;              mg2 

00:53 Drug: Albuterol 2.5 mg Route: Inhalation;                                               mg2 

00:53 Drug: AtroVENT Aerosol 0.5 mg Route: Inhalation;                                        mg2 

02:18 Drug: Potassium Chloride 20 mEq Route: IV; Rate: calculated rate; Site: right           mg2 

      antecubital;                                                                                

02:18 Drug: Potassium Chloride 40 mEq Route: PO;                                              mg2 

04:06 Follow up: Response: No adverse reaction                                                mg2 

02:18 Drug: Magnesium Sulfate 1 grams Route: IVPB; Infused Over: 1 hrs; Site: right           mg2 

      antecubital;                                                                                

04:59 Drug: Potassium Effervescent Tablet 50 mEq Route: PO;                                   mg2 

05:11 Follow up: Response: No adverse reaction; Medication administered at discharge.         mg2 

                                                                                                  

                                                                                                  

Disposition:                                                                                      

05/15/18 04:54 Discharged to Home. Impression: Severe persistent asthma with (acute)              

  exacerbation, Hypokalemia.                                                                      

- Condition is Stable.                                                                            

- Discharge Instructions: Asthma, Adult, Potassium Content of Foods, Asthma, Adult,               

  Easy-to-Read.                                                                                   

- Prescriptions for Pepcid 20 mg Oral Tablet - take 1 tablet by ORAL route every 12               

  hours for 5 days; 10 tablet. Prednisone 20 mg Oral Tablet - take 2 tablet by ORAL               

  route once daily for 5 days; 10 tablet. Albuterol Sulfate 90 mcg/actuation - inhale             

  1-2 puff by INHALATION route every 4-6 hours; 1 Inhaler.                                        

- Medication Reconciliation Form, Thank You Letter, Antibiotic Education, Prescription            

  Opioid Use form.                                                                                

- Follow up: Private Physician; When: 2 - 3 days; Reason: Recheck today's complaints,             

  Continuance of care, Re-evaluation by your physician. Follow up: Emergency                      

  Department; When: As needed; Reason: Worsening of condition.                                    

                                                                                                  

                                                                                                  

                                                                                                  

Addendum:                                                                                         

2018                                                                                        

     07:04 Co-signature as Attending Physician, Rebel Armando MD I agree with the assessment and t
w4

           plan of care.                                                                          

                                                                                                  

Signatures:                                                                                       

Dispatcher [employer]                           EDMS                                                 

Katlyn Hernandez, KISHOR-C                 FNP-Csnw                                                  

Kelsey Juarez RN                         RN   lp1                                                  

Rebel Armando MD MD   tw4                                                  

Sal Law RN                    RN   mg2                                                  

                                                                                                  

Corrections: (The following items were deleted from the chart)                                    

05/15                                                                                             

05:17 04:54 05/15/2018 04:54 Discharged to Home. Impression: Severe persistent asthma with    mg2 

      (acute) exacerbation; Hypokalemia. Condition is Stable. Discharge Instructions: Asthma,     

      Adult, Potassium Content of Foods, Asthma, Adult, Easy-to-Read. Prescriptions for           

      Pepcid 20 mg Oral Tablet - take 1 tablet by ORAL route every 12 hours for 5 days; 10        

      tablet, Prednisone 20 mg Oral Tablet - take 2 tablet by ORAL route once daily for 5         

      days; 10 tablet, Albuterol Sulfate 90 mcg/actuation - inhale 1-2 puff by INHALATION         

      route every 4-6 hours; 1 Inhaler. and Forms are Medication Reconciliation Form, Thank       

      You Letter, Antibiotic Education, Prescription Opioid Use. Follow up: Private               

      Physician; When: 2 - 3 days; Reason: Recheck today's complaints, Continuance of care,       

      Re-evaluation by your physician. Follow up: Emergency Department; When: As needed;          

      Reason: Worsening of condition. snw                                                         

                                                                                                  

**************************************************************************************************

## 2018-05-18 ENCOUNTER — HOSPITAL ENCOUNTER (EMERGENCY)
Dept: HOSPITAL 97 - ER | Age: 23
LOS: 1 days | Discharge: LEFT BEFORE DISCHARGE | End: 2018-05-19
Payer: SELF-PAY

## 2018-05-18 DIAGNOSIS — F17.210: ICD-10-CM

## 2018-05-18 DIAGNOSIS — J45.901: Primary | ICD-10-CM

## 2018-05-18 PROCEDURE — 99284 EMERGENCY DEPT VISIT MOD MDM: CPT

## 2018-05-19 NOTE — ER
Nurse's Notes                                                                                     

 [facility]                                                                

Name: Miles Verma                                                                             

Age: 22 yrs                                                                                       

Sex: Male                                                                                         

: 1995                                                                                   

MRN: [id number]                                                                                   

Arrival Date: 2018                                                                          

Time: 23:13                                                                                       

Account#: [id number]                                                                            

Bed 20                                                                                            

Private MD:                                                                                       

Diagnosis:                                                                                        

                                                                                                  

Presentation:                                                                                     

                                                                                             

23:13 Presenting complaint: EMS states: "Patient was walking when started to wheeze. Neb      ao  

      treatment with albuterol and Atrovent was given. Patient O2 was 98% before neb was          

      given.". Transition of care: patient was not received from another setting of care.         

      Onset of symptoms is unknown. Initial Sepsis Screen: Does the patient meet any 2            

      criteria? RR > 20 per min. No. Patient's initial sepsis screen is negative. Does the        

      patient have a suspected source of infection? No. Patient's initial sepsis screen is        

      negative. Care prior to arrival: None. Medication(s) given: Albuterol Neb x 1, Atrovent     

      Neb x 1.                                                                                    

23:13 Method Of Arrival: EMS: [facility]                                                    ao  

23:13 Acuity: JANAE 3                                                                           ao  

                                                                                                  

Historical:                                                                                       

- Allergies:                                                                                      

23:18 NKA;                                                                                    ao  

- Home Meds:                                                                                      

23:18 Albuterol Inhl [Active];                                                                ao  

- PMHx:                                                                                           

23:18 Asthma;                                                                                 ao  

- PSHx:                                                                                           

23:18 None;                                                                                   ao  

                                                                                                  

- Immunization history:: Adult Immunizations unknown.                                             

- Social history:: Smoking status: Patient uses tobacco products, smokes one-half pack            

  cigarettes per day, Patient/guardian denies using alcohol, street drugs.                        

                                                                                                  

                                                                                                  

Screenin:13 Abuse screen: Denies threats or abuse. Denies injuries from another. Nutritional        bp  

      screening: No deficits noted. Tuberculosis screening: No symptoms or risk factors           

      identified. Fall Risk None identified.                                                      

                                                                                                  

Assessment:                                                                                       

23:14 General: Appears in no apparent distress. comfortable, Behavior is calm, cooperative,   bp  

      appropriate for age, 23YO BM CALLED EMS WHILE WALKING BETWEEN TOWNS, VS STABLE, SP02        

      >98 ON RA PRIOR TO TREATMENT. PT MAKING LARYNGEAL NOISES WHILE BREATHING, BUT NO            

      WHEEZING NOTED. Pain: Denies pain.                                                          

23:30 Reassessment: NO FURTHER LARYNGEAL NOISES NOTED, VS STABLE ON MONITOR AND ROOM AIR.     bp  

                                                                                             

00:41 Reassessment: PT ELOPE WITHOUT NOTIFYING STAFF. LAST SEEN IN STABLE CONDITION.          bp  

                                                                                                  

Vital Signs:                                                                                      

                                                                                             

23:16  / 69; Pulse 67; Resp 30 S; Temp 98.2(O); Pulse Ox 98% on R/A; Weight 80.74 kg    ao  

      (R); Height 5 ft. 8 in. (172.72 cm) (R);                                                    

23:30  / 61; Pulse 65; Resp 14; Pulse Ox 97% ;                                          bp  

23:16 Body Mass Index 27.06 (80.74 kg, 172.72 cm)                                             ao  

                                                                                                  

ED Course:                                                                                        

23:13 Patient arrived in ED.                                                                  ao  

23:13 Gal Nicole, RN is Primary Nurse.                                                    bp  

23:13 Patient has correct armband on for positive identification. Bed in low position. Call   bp  

      light in reach. Side rails up X2.                                                           

23:16 Triage completed.                                                                       ao  

23:18 Patient placed in an exam room, on a stretcher, on pulse oximetry, Patient notified of  ao  

      wait time.                                                                                  

23:44 Dioni Knowles PA is PHCP.                                                                cp  

23:44 Dioni Aguilera MD is Attending Physician.                                             cp  

                                                                                             

00:42 No provider procedures requiring assistance completed. Patient did not have IV access   bp  

      during this emergency room visit.                                                           

                                                                                                  

Administered Medications:                                                                         

00:06 Drug: Albuterol 2.5 mg Route: Inhalation;                                               bp  

00:06 Drug: AtroVENT Aerosol 0.5 mg Route: Inhalation;                                        bp  

00:06 Drug: predniSONE 50 mg Route: PO;                                                       bp  

00:06 Follow up: Response: No adverse reaction                                                bp  

00:06 Drug: Augmentin 875 mg Route: PO;                                                       bp  

00:07 Follow up: Response: No adverse reaction                                                bp  

                                                                                                  

                                                                                                  

Outcome:                                                                                          

00:42 Eloped from patient exam room, after seeing physician Time discovered patient gone: May bp  

      2018 at 00:40                                                                           

00:42 Condition: stable                                                                           

00:43 Patient left the ED.                                                                    bp  

                                                                                                  

Signatures:                                                                                       

Dioni Knowles PA PA   cp                                                   

Kedar Kay RN                         RN   ao                                                   

Gal Nicole, RN                      RN   bp                                                   

                                                                                                  

Corrections: (The following items were deleted from the chart)                                    

                                                                                             

23:16 23:13 Care prior to arrival: None. ao                                                   ao  

                                                                                                  

**************************************************************************************************

## 2018-05-20 NOTE — EDPHYS
Physician Documentation                                                                           

 [facility]                                                                

Name: Miles Verma                                                                             

Age: 22 yrs                                                                                       

Sex: Male                                                                                         

: 1995                                                                                   

MRN: [id number]                                                                                   

Arrival Date: 2018                                                                          

Time: 23:13                                                                                       

Account#: [id number]                                                                            

Bed 20                                                                                            

Private MD:                                                                                       

ED Physician Dioni Aguilera                                                                      

HPI:                                                                                              

                                                                                             

23:57 This 22 yrs old Black Male presents to ER via EMS with complaints of Asthma             cp  

      Exacerbation.                                                                               

23:57 The patient presents to the emergency department with wheezing, Current therapy:        cp  

      albuterol inhaler, that began walking outside, the patient was reported to have chest       

      tightness. Onset: The symptoms/episode began/occurred today. Associated signs and           

      symptoms: Pertinent positives: drainage from right ear, Pertinent negatives: chest          

      pain, fever, nausea, vomiting. Severity of symptoms: in the emergency department the        

      symptoms are unchanged despite EMS interventions.                                           

                                                                                                  

Historical:                                                                                       

- Allergies:                                                                                      

23:18 NKA;                                                                                    ao  

- Home Meds:                                                                                      

23:18 Albuterol Inhl [Active];                                                                ao  

- PMHx:                                                                                           

23:18 Asthma;                                                                                 ao  

- PSHx:                                                                                           

23:18 None;                                                                                   ao  

                                                                                                  

- Immunization history:: Adult Immunizations unknown.                                             

- Social history:: Smoking status: Patient uses tobacco products, smokes one-half pack            

  cigarettes per day, Patient/guardian denies using alcohol, street drugs.                        

                                                                                                  

                                                                                                  

ROS:                                                                                              

                                                                                             

00:05 Constitutional: Negative for body aches, chills, fever, poor PO intake.                 cp  

00:05 Eyes: Negative for injury, pain, redness, and discharge.                                cp  

00:05 ENT: Positive for drainage from ear(s), Negative for ear pain, sinus congestion, sinus      

      pain, sore throat, difficulty swallowing, difficulty handling secretions.                   

00:05 Neck: Negative for pain with movement, pain at rest, stiffness, swollen nodes.              

00:05 Cardiovascular: Negative for chest pain, edema, palpitations.                               

00:05 Respiratory: Positive for shortness of breath, Negative for cough.                          

00:05 Abdomen/GI: Negative for abdominal pain, nausea, vomiting, and diarrhea, constipation,      

      black/tarry stool, rectal bleeding.                                                         

00:05 : Negative for urinary symptoms.                                                          

00:05 Skin: Negative for cellulitis, rash.                                                        

00:05 Neuro: Negative for altered mental status, headache, weakness.                              

00:05 All other systems are negative.                                                             

                                                                                                  

Exam:                                                                                             

00:10 Constitutional: The patient appears in no acute distress, alert, awake,                 cp  

      non-diaphoretic, non-toxic, well developed, well nourished.                                 

00:10 Head/Face:  Normocephalic, atraumatic.                                                  cp  

00:10 Eyes: Periorbital structures: appear normal, Conjunctiva: normal, no exudate, no            

      injection, Lids and lashes: appear normal, bilaterally.                                     

00:10 ENT: External ear(s): are unremarkable, Ear canal(s): are normal, clear, TM's: bulging,     

      on the right, erythema, that is moderate, on the right, Examination of the other ear        

      shows no obvious abnormality, Nose: is normal, Mouth: is normal, Posterior pharynx: is      

      normal, airway is patent, no erythema, no exudate.                                          

00:10 Neck: ROM/movement: is normal, is supple, without pain, no range of motions                 

      limitations, no meningismus, no nuchal rigidity, Lymph nodes: no appreciated                

      lymphadenopathy.                                                                            

00:10 Chest/axilla: Inspection: normal, Palpation: is normal, no crepitus, no tenderness.         

00:10 Cardiovascular: Rate: normal, Rhythm: regular, Edema: is not appreciated, JVD: is not       

      appreciated.                                                                                

00:10 Respiratory: the patient does not display signs of respiratory distress,  Respirations:     

      labored breathing, is not present, intercostal retractions, are absent, shallow             

      respirations, are not present, splinting, is not noted, tachypnea, is not appreciated,      

      Breath sounds: bronchial sounds, are not appreciated, stridor, is not appreciated,          

      wheezing: that is mild, is heard in the  left posterior lower lobe.                         

00:10 Abdomen/GI: Exam negative for discomfort, distension, guarding, Inspection: abdomen         

      appears normal.                                                                             

00:10 Skin: cellulitis, is not appreciated, no rash present.                                      

                                                                                                  

Vital Signs:                                                                                      

                                                                                             

23:16  / 69; Pulse 67; Resp 30 S; Temp 98.2(O); Pulse Ox 98% on R/A; Weight 80.74 kg    ao  

      (R); Height 5 ft. 8 in. (172.72 cm) (R);                                                    

23:30  / 61; Pulse 65; Resp 14; Pulse Ox 97% ;                                          bp  

23:16 Body Mass Index 27.06 (80.74 kg, 172.72 cm)                                             ao  

                                                                                                  

MDM:                                                                                              

23:44 Patient medically screened.                                                             cp  

05/19                                                                                             

00:15 Differential diagnosis: acute asthma, exercise-induced asthma, reactive airway, URI,    cp  

      pneumonia, otitis media, ruptured tympanic membrane. Antibiotic administration: The         

      patient is discharged and will get outpatient antibiotics, Amoxicillin.                     

                                                                                                  

Administered Medications:                                                                         

00:06 Drug: Albuterol 2.5 mg Route: Inhalation;                                               bp  

00:06 Drug: AtroVENT Aerosol 0.5 mg Route: Inhalation;                                        bp  

00:06 Drug: predniSONE 50 mg Route: PO;                                                       bp  

00:06 Follow up: Response: No adverse reaction                                                bp  

00:06 Drug: Augmentin 875 mg Route: PO;                                                       bp  

00:07 Follow up: Response: No adverse reaction                                                bp  

                                                                                                  

                                                                                                  

Disposition:                                                                                      

18 00:43 Patient left the facility after being seen by provider.                            

- Patient left due to unknown.                                                                    

                                                                                                  

                                                                                                  

                                                                                                  

                                                                                                  

Addendum:                                                                                         

2018                                                                                        

     07:47 Co-signature as Attending Physician, Dioni Aguilera MD I agree with the assessment and  c
ha

           plan of care.                                                                          

                                                                                                  

Signatures:                                                                                       

Dioni Aguilera MD MD cha Page, Corey, PA                         PA   cp                                                   

Kedar Kay, RN                         RN   Gal Mercado, MARCE                      RN   bp                                                   

                                                                                                  

**************************************************************************************************

## 2018-05-29 ENCOUNTER — HOSPITAL ENCOUNTER (EMERGENCY)
Dept: HOSPITAL 97 - ER | Age: 23
Discharge: HOME | End: 2018-05-29
Payer: SELF-PAY

## 2018-05-29 DIAGNOSIS — J45.21: Primary | ICD-10-CM

## 2018-05-29 PROCEDURE — 99284 EMERGENCY DEPT VISIT MOD MDM: CPT

## 2018-05-29 NOTE — ER
Nurse's Notes                                                                                     

 [facility]                                                                

Name: Miles Verma                                                                             

Age: 22 yrs                                                                                       

Sex: Male                                                                                         

: 1995                                                                                   

MRN: [id number]                                                                                   

Arrival Date: 2018                                                                          

Time: 22:46                                                                                       

Account#: [id number]                                                                            

Bed 16                                                                                            

Private MD:                                                                                       

Diagnosis: Mild intermittent asthma with (acute) exacerbation                                     

                                                                                                  

Presentation:                                                                                     

                                                                                             

22:47 Presenting complaint: EMS states: they were toned out for report of pt having shortness bb  

      of breath pt has asthma and was walking when he became short of breath. Transition of       

      care: patient was not received from another setting of care. Onset of symptoms was May      

      29, 2018. Risk Assessment: Do you want to hurt yourself or someone else? Patient            

      reports no desire to harm self or others. Initial Sepsis Screen: Does the patient meet      

      any 2 criteria? No. Patient's initial sepsis screen is negative. Does the patient have      

      a suspected source of infection? No. Patient's initial sepsis screen is negative. Care      

      prior to arrival: Medication(s) given: Albuterol Neb x 2, Atrovent Neb x 1.                 

22:47 Method Of Arrival: EMS: [facility]                                                    bb  

22:47 Acuity: JANAE 3                                                                           bb  

                                                                                                  

Triage Assessment:                                                                                

22:49 General: Appears in no apparent distress. slender, Behavior is calm, cooperative. Pain: bb  

      Denies pain. Neuro: Level of Consciousness is awake, alert, obeys commands, Oriented to     

      person, place, time, situation. Cardiovascular: Heart tones S1 S2 present Capillary         

      refill < 3 seconds Patient's skin is warm and dry. Respiratory: Reports shortness of        

      breath Airway is patent Respiratory effort is even, unlabored, Breath sounds with           

      wheezes bilaterally. Onset: The symptoms/episode began/occurred just prior to arrival,      

      the patient has mild shortness of breath. GI: No signs and/or symptoms were reported        

      involving the gastrointestinal system. Derm: Skin is dry, Skin is normal, Skin              

      temperature is warm. Musculoskeletal: Circulation, motion, and sensation intact.            

                                                                                                  

Historical:                                                                                       

- Allergies:                                                                                      

22:49 NKA;                                                                                    bb  

- Home Meds:                                                                                      

22:49 Albuterol Inhl [Active];                                                                bb  

- PMHx:                                                                                           

22:49 Asthma;                                                                                 bb  

- PSHx:                                                                                           

22:49 None;                                                                                   bb  

                                                                                                  

- Immunization history:: Adult Immunizations up to date.                                          

- Social history:: Smoking status: Patient/guardian denies using tobacco,                         

  Patient/guardian denies using alcohol, street drugs.                                            

- Ebola Screening: : No symptoms or risks identified at this time.                                

                                                                                                  

                                                                                                  

Screenin:52 Abuse screen: Denies threats or abuse. Nutritional screening: No deficits noted.        bb  

      Tuberculosis screening: No symptoms or risk factors identified. Fall Risk None              

      identified.                                                                                 

                                                                                                  

Assessment:                                                                                       

22:52 Reassessment: No changes from previously documented assessment. see triage assessment.  bb  

      AGUSTÍN HENDRICKS at bedside for evaluation of pt.                                                

22:54 Cardiovascular: Rhythm is sinus tachycardia.                                            bb  

23:06 Reassessment: pt eloped with IV intact, AGUSTÍN HENDRICKS notified, ASTRID Caldwell RN notified.   bb  

                                                                                                  

Vital Signs:                                                                                      

22:49  / 88; Pulse 103; Resp 20 S; Temp 98.4(O); Pulse Ox 97% on R/A; Weight 79.38 kg   bb  

      (R); Height 5 ft. 7 in. (170.18 cm) (R); Pain 0/10;                                         

22:49 Body Mass Index 27.41 (79.38 kg, 170.18 cm)                                             bb  

                                                                                                  

ED Course:                                                                                        

22:46 Patient arrived in ED.                                                                  bb  

22:46 Mary Schuler, RN is Primary Nurse.                                                   bb  

22:48 Triage completed.                                                                       bb  

22:48 Jayme Campbell PA is PHCP.                                                               jr8 

22:49 Arm band placed on Patient placed in an exam room, on a stretcher, on pulse oximetry.   bb  

22:52 Juan A Carrera MD is Attending Physician.                                                jr8 

22:52 Patient has correct armband on for positive identification. Placed in gown. Bed in low  bb  

      position. Call light in reach. Side rails up X 1. Pulse ox on. NIBP on.                     

22:52 No provider procedures requiring assistance completed. Maintain EMS IV. Dressing        bb  

      intact. Site clean \T\ dry. Gauge \T\ site: 20g L AC.                                       

23:50 Patient left without discharge instructions. Attempted to contact several times,        pt  

      patient answers phone and does not speak. [place] [employer] notified that patient left with       

      IV and PD will do welfare check.                                                            

                                                                                                  

Administered Medications:                                                                         

No medications were administered                                                                  

                                                                                                  

                                                                                                  

Outcome:                                                                                          

22:54 Discharge ordered by MD.                                                                [id number] 

23:42 Patient left the ED.                                                                    bb  

                                                                                                  

Signatures:                                                                                       

Johana Caldwell RN                     RN   pt                                                   

Mary Schuler RN                     RN   bb                                                   

Roszak, Jayme, PA                        PA   jr8                                                  

                                                                                                  

**************************************************************************************************

## 2018-05-29 NOTE — EDPHYS
Physician Documentation                                                                           

 [facility]                                                                

Name: Miles Verma                                                                             

Age: 22 yrs                                                                                       

Sex: Male                                                                                         

: 1995                                                                                   

MRN: [id number]                                                                                   

Arrival Date: 2018                                                                          

Time: 22:46                                                                                       

Account#: [id number]                                                                            

Bed 16                                                                                            

Private MD:                                                                                       

ED Physician Juan A Carrera                                                                         

HPI:                                                                                              

                                                                                             

22:52 This 22 yrs old Black Male presents to ER via EMS with complaints of Shortness Of       jr8 

      Breath.                                                                                     

22:52 The patient has shortness of breath while walking. Onset: The symptoms/episode          jr8 

      began/occurred acutely, just prior to arrival, today. Duration: The symptoms are            

      continuous. The patient's shortness of breath is aggravated by walking. Associated          

      signs and symptoms: The patient has no apparent associated signs or symptoms. Severity      

      of symptoms: At their worst the symptoms were moderate in the emergency department the      

      symptoms have resolved. The patient has experienced similar episodes in the past, a few     

      times. The patient has not recently seen a physician. Patient brought in by EMS after       

      calling for asthma attack. Was administered breathing treatment and brought to              

      hospital. Patient stated that it now has resolved after treatment and wants to go home .    

                                                                                                  

Historical:                                                                                       

- Allergies:                                                                                      

22:49 NKA;                                                                                    bb  

- Home Meds:                                                                                      

22:49 Albuterol Inhl [Active];                                                                bb  

- PMHx:                                                                                           

22:49 Asthma;                                                                                 bb  

- PSHx:                                                                                           

22:49 None;                                                                                   bb  

                                                                                                  

- Immunization history:: Adult Immunizations up to date.                                          

- Social history:: Smoking status: Patient/guardian denies using tobacco,                         

  Patient/guardian denies using alcohol, street drugs.                                            

- Ebola Screening: : No symptoms or risks identified at this time.                                

                                                                                                  

                                                                                                  

ROS:                                                                                              

22:52 Eyes: Negative for injury, pain, redness, and discharge, ENT: Negative for injury,      jr8 

      pain, and discharge, Neck: Negative for injury, pain, and swelling, Cardiovascular:         

      Negative for chest pain, palpitations, and edema, Abdomen/GI: Negative for abdominal        

      pain, nausea, vomiting, diarrhea, and constipation, Back: Negative for injury and pain,     

      MS/Extremity: Negative for injury and deformity, Skin: Negative for injury, rash, and       

      discoloration, Neuro: Negative for headache, weakness, numbness, tingling, and seizure.     

22:52 Respiratory: Positive for shortness of breath, wheezing, Negative for cough,                

      hemoptysis, orthopnea, pleurisy.                                                            

                                                                                                  

Exam:                                                                                             

22:52 Eyes:  Pupils equal round and reactive to light, extra-ocular motions intact.  Lids and jr8 

      lashes normal.  Conjunctiva and sclera are non-icteric and not injected.  Cornea within     

      normal limits.  Periorbital areas with no swelling, redness, or edema. ENT:  Nares          

      patent. No nasal discharge, no septal abnormalities noted.  Tympanic membranes are          

      normal and external auditory canals are clear.  Oropharynx with no redness, swelling,       

      or masses, exudates, or evidence of obstruction, uvula midline.  Mucous membranes           

      moist. Neck:  Trachea midline, no thyromegaly or masses palpated, and no cervical           

      lymphadenopathy.  Supple, full range of motion without nuchal rigidity, or vertebral        

      point tenderness.  No Meningismus. Cardiovascular:  Regular rate and rhythm with a          

      normal S1 and S2.  No gallops, murmurs, or rubs.  Normal PMI, no JVD.  No pulse             

      deficits. Respiratory:  Lungs have equal breath sounds bilaterally, clear to                

      auscultation and percussion.  No rales, rhonchi or wheezes noted.  No increased work of     

      breathing, no retractions or nasal flaring. Abdomen/GI:  Soft, non-tender, with normal      

      bowel sounds.  No distension or tympany.  No guarding or rebound.  No evidence of           

      tenderness throughout. Back:  No spinal tenderness.  No costovertebral tenderness.          

      Full range of motion. Skin:  Warm, dry with normal turgor.  Normal color with no            

      rashes, no lesions, and no evidence of cellulitis. MS/ Extremity:  Pulses equal, no         

      cyanosis.  Neurovascular intact.  Full, normal range of motion. Neuro:  Awake and           

      alert, GCS 15, oriented to person, place, time, and situation.  Cranial nerves II-XII       

      grossly intact.  Motor strength 5/5 in all extremities.  Sensory grossly intact.            

      Cerebellar exam normal.  Normal gait.                                                       

                                                                                                  

Vital Signs:                                                                                      

22:49  / 88; Pulse 103; Resp 20 S; Temp 98.4(O); Pulse Ox 97% on R/A; Weight 79.38 kg   bb  

      (R); Height 5 ft. 7 in. (170.18 cm) (R); Pain 0/10;                                         

22:49 Body Mass Index 27.41 (79.38 kg, 170.18 cm)                                             bb  

                                                                                                  

MDM:                                                                                              

22:52 Patient medically screened.                                                             jr8 

22:52 Data reviewed: vital signs, nurses notes, and as a result, I will discharge patient.    jr8 

      Data interpreted: Pulse oximetry: on room air is 97 %. Interpretation: normal.              

      Counseling: I had a detailed discussion with the patient and/or guardian regarding: the     

      historical points, exam findings, and any diagnostic results supporting the                 

      discharge/admit diagnosis, the need for outpatient follow up, a family practitioner, to     

      return to the emergency department if symptoms worsen or persist or if there are any        

      questions or concerns that arise at home. ED course: Patient refuses IV steroid or oral     

      steroids for home. Has albuterol inhaler at home. Will continue to use that. To come        

      back if worse .                                                                             

                                                                                                  

Administered Medications:                                                                         

No medications were administered                                                                  

                                                                                                  

                                                                                                  

Disposition:                                                                                      

                                                                                             

06:47 Co-signature as Attending Physician, Juan A Carrera MD.                                    rn  

                                                                                                  

Disposition:                                                                                      

18 22:54 Discharged to Home. Impression: Mild intermittent asthma with (acute)              

  exacerbation.                                                                                   

- Condition is Stable.                                                                            

- Discharge Instructions: Asthma, Adult.                                                          

                                                                                                  

- Medication Reconciliation Form, Thank You Letter, Antibiotic Education, Prescription            

  Opioid Use form.                                                                                

- Follow up: Private Physician; When: 2 - 3 days; Reason: Recheck today's complaints,             

  Continuance of care, Re-evaluation by your physician.                                           

- Problem is new.                                                                                 

- Symptoms are resolved.                                                                          

                                                                                                  

                                                                                                  

                                                                                                  

Signatures:                                                                                       

Mary Schuler RN RN bb Nieto, Roman, MD MD rn Roszak, Josh, PA PA   jr8                                                  

                                                                                                  

Corrections: (The following items were deleted from the chart)                                    

                                                                                             

23:42 22:54 2018 22:54 Discharged to Home. Impression: Mild intermittent asthma with    bb  

      (acute) exacerbation. Condition is Stable. Forms are Medication Reconciliation Form,        

      Thank You Letter, Antibiotic Education, Prescription Opioid Use. Follow up: Private         

      Physician; When: 2 - 3 days; Reason: Recheck today's complaints, Continuance of care,       

      Re-evaluation by your physician. Problem is new. Symptoms are resolved. jr8                 

                                                                                                  

**************************************************************************************************

## 2018-06-15 ENCOUNTER — HOSPITAL ENCOUNTER (EMERGENCY)
Dept: HOSPITAL 97 - ER | Age: 23
Discharge: LEFT BEFORE DISCHARGE | End: 2018-06-15
Payer: SELF-PAY

## 2018-06-15 DIAGNOSIS — Z53.21: Primary | ICD-10-CM

## 2018-06-15 PROCEDURE — 99282 EMERGENCY DEPT VISIT SF MDM: CPT

## 2018-06-15 NOTE — ER
Nurse's Notes                                                                                     

 [facility]                                                                

Name: Miles Verma                                                                             

Age: 22 yrs                                                                                       

Sex: Male                                                                                         

: 1995                                                                                   

MRN: [id number]                                                                                   

Arrival Date: 06/15/2018                                                                          

Time: 16:15                                                                                       

Account#: [id number]                                                                            

Bed 9                                                                                             

Private MD:                                                                                       

Diagnosis: Cannabis abuse                                                                         

                                                                                                  

Presentation:                                                                                     

06/15                                                                                             

16:16 Presenting complaint: EMS states: found on side of road, drowsy, vomit x2. Pt           ss  

      reportedly smoked marijuana and was outside. After 750 mL IV fluid, pt reports he is        

      feeling better, just thirsty. Denies pain. Transition of care: patient was not received     

      from another setting of care. Onset of symptoms was Diane 15, 2018. Risk Assessment: Do      

      you want to hurt yourself or someone else? Patient reports no desire to harm self or        

      others. Initial Sepsis Screen: Does the patient meet any 2 criteria? No. Patient's          

      initial sepsis screen is negative. Does the patient have a suspected source of              

      infection? No. Patient's initial sepsis screen is negative. Care prior to arrival:          

      Medication(s) given: Normal saline infusion, 750 mL IV initiated. 18 GA, in the right       

      antecubital area.                                                                           

16:16 Method Of Arrival: EMS: [facility]                                                ss  

16:16 Acuity: JANAE 3                                                                           ss  

                                                                                                  

Triage Assessment:                                                                                

16:16 General: Appears in no apparent distress. comfortable, Behavior is calm, cooperative.   ss  

      Pain: Denies pain. Neuro: Level of Consciousness is awake, alert, Oriented to person,       

      place, time, situation. Respiratory: Airway is patent Respiratory effort is even,           

      unlabored, Respiratory pattern is regular, symmetrical. Derm: Skin is intact, is            

      healthy with good turgor, Skin is dry, Skin is pink, warm \T\ dry. normal.                  

                                                                                                  

Historical:                                                                                       

- Allergies:                                                                                      

16:18 NKA;                                                                                    ss  

- Home Meds:                                                                                      

16:18 None [Active];                                                                          ss  

- PMHx:                                                                                           

16:18 Asthma;                                                                                 ss  

- PSHx:                                                                                           

16:18 None;                                                                                   ss  

                                                                                                  

- Immunization history:: Adult Immunizations up to date.                                          

- Social history:: Smoking status: Patient/guardian denies using tobacco, Patient uses            

  street drugs, marijuana.                                                                        

- Ebola Screening: : Patient denies exposure to infectious person Patient denies travel           

  to an Ebola-affected area in the 21 days before illness onset.                                  

                                                                                                  

                                                                                                  

Vital Signs:                                                                                      

16:18  / 68; Pulse 91; Resp 16; Temp 98.3(O); Pulse Ox 97% on R/A; Weight 71.21 kg;     ss  

      Height 5 ft. 7 in. (170.18 cm); Pain 0/10;                                                  

16:18 Body Mass Index 24.59 (71.21 kg, 170.18 cm)                                               

                                                                                                  

ED Course:                                                                                        

16:15 Patient arrived in ED.                                                                    

16:18 Triage completed.                                                                         

16:18 Arm band placed on right wrist.                                                           

16:26 Jose Rose NP is PHCP.                                                           pm1 

16:26 Rome Montez MD is Attending Physician.                                              pm1 

16:27 No provider procedures requiring assistance completed. patient left prior to having IV  ss  

      dc'd. Pt is nowhere to be found.                                                            

                                                                                                  

Administered Medications:                                                                         

No medications were administered                                                                  

                                                                                                  

                                                                                                  

Outcome:                                                                                          

16:27 Eloped from patient exam room, before seeing physician                                    

16:27 Condition: stable                                                                           

16:30 Patient left the ED.                                                                      

                                                                                                  

Signatures:                                                                                       

Yesika Juan RN                      RN                                                      

Jose Rose NP                    NP   pm1                                                  

                                                                                                  

**************************************************************************************************

## 2018-06-15 NOTE — EDPHYS
Physician Documentation                                                                           

 [facility]                                                                

Name: Miles Verma                                                                             

Age: 22 yrs                                                                                       

Sex: Male                                                                                         

: 1995                                                                                   

MRN: [id number]                                                                                   

Arrival Date: 06/15/2018                                                                          

Time: 16:15                                                                                       

Account#: [id number]                                                                            

Bed 9                                                                                             

Private MD:                                                                                       

ED Physician Rome Montez                                                                       

Historical:                                                                                       

- Allergies:                                                                                      

06/15                                                                                             

16:18 NKA;                                                                                    ss  

- Home Meds:                                                                                      

16:18 None [Active];                                                                          ss  

- PMHx:                                                                                           

16:18 Asthma;                                                                                 ss  

- PSHx:                                                                                           

16:18 None;                                                                                   ss  

                                                                                                  

- Immunization history:: Adult Immunizations up to date.                                          

- Social history:: Smoking status: Patient/guardian denies using tobacco, Patient uses            

  street drugs, marijuana.                                                                        

- Ebola Screening: : Patient denies exposure to infectious person Patient denies travel           

  to an Ebola-affected area in the 21 days before illness onset.                                  

                                                                                                  

                                                                                                  

Vital Signs:                                                                                      

16:18  / 68; Pulse 91; Resp 16; Temp 98.3(O); Pulse Ox 97% on R/A; Weight 71.21 kg;     ss  

      Height 5 ft. 7 in. (170.18 cm); Pain 0/10;                                                  

16:18 Body Mass Index 24.59 (71.21 kg, 170.18 cm)                                             ss  

                                                                                                  

MDM:                                                                                              

16:28 Medical screening is not applicable.                                                    pm1 

16:29 ED course: Patient left before being evaluated by me.                                   pm1 

                                                                                                  

Administered Medications:                                                                         

No medications were administered                                                                  

                                                                                                  

                                                                                                  

Disposition:                                                                                      

06/15/18 16:28 Patient left the facility before being seen by provider. Preliminary diagnosis     

  is Cannabis abuse.                                                                              

- Patient left due to (see nurse's notes).                                                        

- Condition is Undetermined.                                                                      

- Problem is new.                                                                                 

- Symptoms are unchanged.                                                                         

                                                                                                  

                                                                                                  

                                                                                                  

Addendum:                                                                                         

2018                                                                                        

     07:03 Co-signature as Attending Physician, Rome Montez MD I agree with the assessment and   k
dr

           plan of care.                                                                          

                                                                                                  

Signatures:                                                                                       

Rome Montez MD MD   [facility]                                                  

Yesika Juan RN                      RN   ss                                                   

Jose Rose, AMBER                    NP   pm1                                                  

                                                                                                  

Corrections: (The following items were deleted from the chart)                                    

06/15                                                                                             

16:30 16:28 06/15/2018 16:28 Patient left the facility before being seen by provider.         ss  

      Preliminary diagnosis is Cannabis abuse. Reason stated they are leaving due to (see         

      nurse's notes). Condition is Undetermined. Problem is new. Symptoms are unchanged. pm1      

                                                                                                  

**************************************************************************************************

## 2018-07-01 ENCOUNTER — HOSPITAL ENCOUNTER (EMERGENCY)
Dept: HOSPITAL 97 - ER | Age: 23
LOS: 1 days | Discharge: HOME | End: 2018-07-02
Payer: SELF-PAY

## 2018-07-01 DIAGNOSIS — J45.909: Primary | ICD-10-CM

## 2018-07-01 DIAGNOSIS — R05: ICD-10-CM

## 2018-07-01 DIAGNOSIS — Z72.0: ICD-10-CM

## 2018-07-01 PROCEDURE — 99284 EMERGENCY DEPT VISIT MOD MDM: CPT

## 2018-07-01 PROCEDURE — 71046 X-RAY EXAM CHEST 2 VIEWS: CPT

## 2018-07-02 NOTE — EDPHYS
Physician Documentation                                                                           

 [facility]                                                                

Name: Miles Verma                                                                             

Age: 22 yrs                                                                                       

Sex: Male                                                                                         

: 1995                                                                                   

MRN: [id number]                                                                                   

Arrival Date: 2018                                                                          

Time: 23:31                                                                                       

Account#: [id number]                                                                            

Bed 25                                                                                            

Private MD:                                                                                       

ED Physician Dioni Aguilera                                                                      

HPI:                                                                                              

                                                                                             

23:49 This 22 yrs old Black Male presents to ER via EMS with complaints of Shortness Of       ervin 

      Breath.                                                                                     

23:49 The patient has shortness of breath with light activity. Onset: The symptoms/episode    ervin 

      began/occurred 2 day(s) ago. Duration: The symptoms are continuous, and are unchanged       

      since they started. The patient's shortness of breath has no apparent modifying             

      factors. Associated signs and symptoms: Pertinent positives: non-productive cough,          

      Pertinent negatives: chest pain. Severity of symptoms: At their worst the symptoms were     

      mild in the emergency department the symptoms are unchanged. The patient has not            

      experienced similar symptoms in the past.                                                   

                                                                                                  

Historical:                                                                                       

- Allergies:                                                                                      

23:33 NKA;                                                                                    bp  

- Home Meds:                                                                                      

23:33 None [Active];                                                                          bp  

- PMHx:                                                                                           

23:33 None;                                                                                   bp  

                                                                                                  

- Immunization history:: Adult Immunizations up to date.                                          

- Social history:: Smoking status: Patient uses tobacco products, denies chronic                  

  smoking, but will smoke occasionally, Patient uses street drugs, marijuana.                     

- Ebola Screening: : Patient negative for fever greater than or equal to 101.5 degrees            

  Fahrenheit, and additional compatible Ebola Virus Disease symptoms Patient denies               

  exposure to infectious person Patient denies travel to an Ebola-affected area in the            

  21 days before illness onset No symptoms or risks identified at this time.                      

- Family history:: not pertinent.                                                                 

                                                                                                  

                                                                                                  

ROS:                                                                                              

23:49 Constitutional: Negative for fever, chills, and weight loss, Eyes: Negative for injury, ervin 

      pain, redness, and discharge, ENT: Negative for injury, pain, and discharge, Neck:          

      Negative for injury, pain, and swelling, Cardiovascular: Negative for chest pain,           

      palpitations, and edema, Abdomen/GI: Negative for abdominal pain, nausea, vomiting,         

      diarrhea, and constipation, Back: Negative for injury and pain, : Negative for            

      injury, bleeding, discharge, and swelling, MS/Extremity: Negative for injury and            

      deformity, Skin: Negative for injury, rash, and discoloration, Neuro: Negative for          

      headache, weakness, numbness, tingling, and seizure, Psych: Negative for depression,        

      anxiety, suicide ideation, homicidal ideation, and hallucinations, Allergy/Immunology:      

      Negative for hives, rash, and allergies, Endocrine: Negative for neck swelling,             

      polydipsia, polyuria, polyphagia, and marked weight changes, Hematologic/Lymphatic:         

      Negative for swollen nodes, abnormal bleeding, and unusual bruising.                        

23:49 Respiratory: Positive for cough, wheezing, expiratory.                                      

                                                                                                  

Exam:                                                                                             

23:49 Constitutional:  This is a well developed, well nourished patient who is awake, alert,  ervin 

      and in no acute distress. Head/Face:  Normocephalic, atraumatic. Eyes:  Pupils equal        

      round and reactive to light, extra-ocular motions intact.  Lids and lashes normal.          

      Conjunctiva and sclera are non-icteric and not injected.  Cornea within normal limits.      

      Periorbital areas with no swelling, redness, or edema. ENT:  Nares patent. No nasal         

      discharge, no septal abnormalities noted.  Tympanic membranes are normal and external       

      auditory canals are clear.  Oropharynx with no redness, swelling, or masses, exudates,      

      or evidence of obstruction, uvula midline.  Mucous membranes moist. Neck:  Trachea          

      midline, no thyromegaly or masses palpated, and no cervical lymphadenopathy.  Supple,       

      full range of motion without nuchal rigidity, or vertebral point tenderness.  No            

      Meningismus. Chest/axilla:  Normal chest wall appearance and motion.  Nontender with no     

      deformity.  No lesions are appreciated. Cardiovascular:  Regular rate and rhythm with a     

      normal S1 and S2.  No gallops, murmurs, or rubs.  Normal PMI, no JVD.  No pulse             

      deficits. Respiratory:  Lungs have equal breath sounds bilaterally, clear to                

      auscultation and percussion.  No rales, rhonchi or wheezes noted.  No increased work of     

      breathing, no retractions or nasal flaring. Back:  No spinal tenderness.  No                

      costovertebral tenderness.  Full range of motion. Male :  Normal genitalia with no        

      discharge or lesions. Skin:  Warm, dry with normal turgor.  Normal color with no            

      rashes, no lesions, and no evidence of cellulitis. MS/ Extremity:  Pulses equal, no         

      cyanosis.  Neurovascular intact.  Full, normal range of motion. Neuro:  Awake and           

      alert, GCS 15, oriented to person, place, time, and situation.  Cranial nerves II-XII       

      grossly intact.  Motor strength 5/5 in all extremities.  Sensory grossly intact.            

      Cerebellar exam normal.  Normal gait. Psych:  Awake, alert, with orientation to person,     

      place and time.  Behavior, mood, and affect are within normal limits.                       

23:49 Abdomen/GI: Inspection: abdomen appears normal, Bowel sounds: normal, Palpation:            

      abdomen is soft and non-tender.                                                             

                                                                                                  

Vital Signs:                                                                                      

23:33  / 77; Pulse 66; Resp 16; Temp 97.9; Pulse Ox 99% ; Weight 77.11 kg;              bp  

                                                                                                  

MDM:                                                                                              

23:44 Patient medically screened.                                                             [facility] 

23:50 Data reviewed: vital signs, nurses notes, radiologic studies.                           [facility] 

                                                                                                  

                                                                                             

23:49 Order name: Chest Pa And Lat (2 Views) XRAY                                             [facility] 

                                                                                                  

Administered Medications:                                                                         

                                                                                             

00:11 Not Given (Patient Eloped): Albuterol - atroVENT (3:1) (2.5 mg - 0.5 mg) 3 ml Nebulizer fc  

      once                                                                                        

00:11 Not Given (Patient Eloped): predniSONE 60 mg PO once                                    fc  

00:11 Not Given (Patient Eloped): Zithromax 500 mg PO once                                    fc  

                                                                                                  

                                                                                                  

Disposition:                                                                                      

18 00:12 Discharged to Home. Impression: Dyspnea, Asthma, Cough.                            

- Condition is Stable.                                                                            

- Discharge Instructions: Asthma, Adult, Cool Mist Vaporizers, Asthma, Adult,                     

  Easy-to-Read, Cough, Adult, Easy-to-Read, Cough, Adult.                                         

- Prescriptions for Albuterol Sulfate 2.5 mg /3 mL (0.083 %) Inhalation Solution for              

  Nebulization - inhale 1 unit by NEBULIZATION route every 8 hours As needed; 1 box.              

  Zithromax Z- Osiel 250 mg Oral Tablet - take 1 tablet by ORAL route as directed for 5             

  days Day 1 - take two (2) tablets one time. Day 2, 3, 4 , 5 take one (1) tablet once            

  daily.; 6 tablet. Prednisone 20 mg Oral Tablet - take 2 tablet by ORAL route once               

  daily for 5 days; 10 tablet. Albuterol Sulfate 90 mcg/actuation - inhale 1-2 puff by            

  INHALATION route every 4-6 hours; 1 Inhaler.                                                    

- Medication Reconciliation Form, Thank You Letter, Antibiotic Education, Prescription            

  Opioid Use form.                                                                                

- Follow up: Private Physician; When: 2 - 3 days; Reason: Recheck today's complaints,             

  Continuance of care, Re-evaluation by your physician.                                           

- Problem is new.                                                                                 

- Symptoms have improved.                                                                         

                                                                                                  

                                                                                                  

                                                                                                  

Signatures:                                                                                       

Dispatcher [employer]                           EDMS                                                 

Dioni Aguilera MD MD cha Peltier, Brian, RN                      RN   bp                                                   

Armin Capellan, MARCE                       RN   mb3                                                  

Monique Newton RN                                                      

                                                                                                  

Corrections: (The following items were deleted from the chart)                                    

00:27 00:12 2018 00:12 Discharged to Home. Impression: Dyspnea; Asthma; Cough.          mb3 

      Condition is Stable. Discharge Instructions: Asthma, Adult, Cool Mist Vaporizers,           

      Asthma, Adult, Easy-to-Read, Cough, Adult, Easy-to-Read, Cough, Adult. Prescriptions        

      for Albuterol Sulfate 2.5 mg /3 mL (0.083 %) Inhalation Solution for Nebulization -         

      inhale 1 unit by NEBULIZATION route every 8 hours As needed; 1 box, Zithromax Z-Osiel 250     

      mg Oral Tablet - take 1 tablet by ORAL route as directed for 5 days Day 1 - take two        

      (2) tablets one time. Day 2, 3, 4 , 5 take one (1) tablet once daily.; 6 tablet,            

      Prednisone 20 mg Oral Tablet - take 2 tablet by ORAL route once daily for 5 days; 10        

      tablet, Albuterol Sulfate 90 mcg/actuation - inhale 1-2 puff by INHALATION route every      

      4-6 hours; 1 Inhaler. and Forms are Medication Reconciliation Form, Thank You Letter,       

      Antibiotic Education, Prescription Opioid Use. Follow up: Private Physician; When: 2 -      

      3 days; Reason: Recheck today's complaints, Continuance of care, Re-evaluation by your      

      physician. Problem is new. Symptoms have improved. ervin                                      

                                                                                                  

**************************************************************************************************

## 2018-07-02 NOTE — RAD REPORT
EXAM DESCRIPTION:  RAD - Chest Pa And Lat (2 Views) - 7/2/2018 12:04 am

 

CLINICAL HISTORY:  Shortness of breath, smoking history

 

COMPARISON:  May 15

 

TECHNIQUE:  PA and lateral views of the chest were obtained.

 

FINDINGS:  The lungs are clear.  Slightly reduced lung volumes increased lung base markings. True azael
g base process is doubtful. Heart size is normal and central vasculature is within normal limits.  No
 pleural effusion or pneumothorax seen.  No acute bony finding noted.  No aortic abnormality.

 

IMPRESSION:  No acute cardiopulmonary process.  No significant interval change.

## 2019-08-02 ENCOUNTER — HOSPITAL ENCOUNTER (EMERGENCY)
Dept: HOSPITAL 97 - ER | Age: 24
LOS: 1 days | Discharge: HOME | End: 2019-08-03
Payer: SELF-PAY

## 2019-08-02 DIAGNOSIS — Z72.0: ICD-10-CM

## 2019-08-02 DIAGNOSIS — J45.901: Primary | ICD-10-CM

## 2019-08-02 PROCEDURE — 99284 EMERGENCY DEPT VISIT MOD MDM: CPT

## 2019-08-02 PROCEDURE — 96365 THER/PROPH/DIAG IV INF INIT: CPT

## 2019-08-02 PROCEDURE — 71046 X-RAY EXAM CHEST 2 VIEWS: CPT

## 2019-08-02 PROCEDURE — 94640 AIRWAY INHALATION TREATMENT: CPT

## 2019-08-02 PROCEDURE — 96375 TX/PRO/DX INJ NEW DRUG ADDON: CPT

## 2019-08-03 NOTE — EDPHYS
Physician Documentation                                                                           

 [facility]                                                                 

Name: Miles Verma                                                                             

Age: 23 yrs                                                                                       

Sex: Male                                                                                         

: 1995                                                                                   

MRN: [id number]                                                                                   

Arrival Date: 2019                                                                          

Time: 23:28                                                                                       

Account#: [id number]                                                                            

Bed 7                                                                                             

Private MD:                                                                                       

ED Physician Dioni Aguilera                                                                      

HPI:                                                                                              

                                                                                             

02:09 This 23 yrs old Black Male presents to ER via EMS with complaints of Shortness Of       kb  

      Breath.                                                                                     

02:09 The patient has shortness of breath with light activity, and the patient has a history  kb  

      of asthma. Onset: The symptoms/episode began/occurred 1 hour pta. Duration: The             

      symptoms are continuous. The patient's shortness of breath is aggravated by walking.        

      Associated signs and symptoms: The patient has no apparent associated signs or              

      symptoms. Severity of symptoms: At their worst the symptoms were moderate in the            

      emergency department the symptoms are unchanged. The patient has experienced similar        

      episodes in the past. The patient has not recently seen a physician. Pt reports             

      shortness of breath that started while walking home.                                        

                                                                                                  

Historical:                                                                                       

- Allergies:                                                                                      

                                                                                             

23:43 NKA;                                                                                    fc  

- Home Meds:                                                                                      

23:43 Unable to obtain [Active];                                                              fc  

- PMHx:                                                                                           

23:43 Asthma;                                                                                 fc  

- PSHx:                                                                                           

23:43 None;                                                                                   fc  

                                                                                                  

- Immunization history:: Last tetanus immunization: unknown.                                      

- Social history:: Smoking status: Patient uses tobacco products, denies chronic                  

  smoking, but will smoke occasionally, Patient/guardian denies using alcohol, street             

  drugs.                                                                                          

- Ebola Screening: : Patient negative for fever greater than or equal to 101.5 degrees            

  Fahrenheit, and additional compatible Ebola Virus Disease symptoms Patient denies               

  exposure to infectious person Patient denies travel to an Ebola-affected area in the            

  21 days before illness onset.                                                                   

                                                                                                  

                                                                                                  

ROS:                                                                                              

                                                                                             

02:08 Constitutional: Negative for fever, chills, and weight loss, Neck: Negative for injury, kb  

      pain, and swelling, Cardiovascular: Negative for chest pain, palpitations, and edema,       

      Abdomen/GI: Negative for abdominal pain, nausea, vomiting, diarrhea, and constipation,      

      Back: Negative for injury and pain, : Negative for injury, bleeding, discharge, and       

      swelling, MS/Extremity: Negative for injury and deformity, Skin: Negative for injury,       

      rash, and discoloration, Neuro: Negative for headache, weakness, numbness, tingling,        

      and seizure.                                                                                

      Respiratory: Positive for shortness of breath, wheezing.                                    

                                                                                                  

Exam:                                                                                             

02:08 Constitutional:  This is a well developed, well nourished patient who is awake, alert,  kb  

      and in no acute distress. Head/Face:  Normocephalic, atraumatic. ENT:  Nares patent. No     

      nasal discharge, no septal abnormalities noted.  Tympanic membranes are normal and          

      external auditory canals are clear.  Oropharynx with no redness, swelling, or masses,       

      exudates, or evidence of obstruction, uvula midline.  Mucous membranes moist. Neck:         

      Trachea midline, no thyromegaly or masses palpated, and no cervical lymphadenopathy.        

      Supple, full range of motion without nuchal rigidity, or vertebral point tenderness.        

      No Meningismus. Chest/axilla:  Normal chest wall appearance and motion.  Nontender with     

      no deformity.  No lesions are appreciated. Cardiovascular:  Regular rate and rhythm         

      with a normal S1 and S2.  No gallops, murmurs, or rubs.  Normal PMI, no JVD.  No pulse      

      deficits. Abdomen/GI:  Soft, non-tender, with normal bowel sounds.  No distension or        

      tympany.  No guarding or rebound.  No evidence of tenderness throughout. Back:  No          

      spinal tenderness.  No costovertebral tenderness.  Full range of motion. Skin:  Warm,       

      dry with normal turgor.  Normal color with no rashes, no lesions, and no evidence of        

      cellulitis. MS/ Extremity:  Pulses equal, no cyanosis.  Neurovascular intact.  Full,        

      normal range of motion. Neuro:  Awake and alert, GCS 15, oriented to person, place,         

      time, and situation.  Cranial nerves II-XII grossly intact.  Motor strength 5/5 in all      

      extremities.  Sensory grossly intact.  Cerebellar exam normal.  Normal gait.                

02:08 Respiratory: the patient does not display signs of respiratory distress,  Respirations:     

      normal, Breath sounds: wheezing: inspiratory                                                

                                                                                                  

Vital Signs:                                                                                      

                                                                                             

23:20  / 93; Pulse 114; Resp 32; Temp 98.2(O); Pulse Ox 99% on R/A; Weight 79.38 kg     fc  

      (R); Height 5 ft. 7 in. (170.18 cm) (R); Pain 9/10;                                         

                                                                                             

00:00  / 86; Pulse 111; Resp 25 S; Pulse Ox 100% on Nebulizer Mask;                     jd3 

00:56  / 75; Pulse 96; Resp 25 S; Pulse Ox 98% on Non-rebreather mask;                  jd3 

01:30  / 79; Pulse 92; Resp 20 S; Pulse Ox 97% on R/A;                                  jd3 

02:48  / 84; Pulse 89; Resp 17 S; Pulse Ox 97% on R/A;                                  jd3 

                                                                                             

23:20 Body Mass Index 27.41 (79.38 kg, 170.18 cm)                                               

                                                                                                  

MDM:                                                                                              

                                                                                             

23:31 Patient medically screened.                                                             [facility] 

                                                                                             

02:07 Data reviewed: vital signs, nurses notes. Data interpreted: Pulse oximetry: on room air kb  

      is 98 %. Interpretation: normal. Counseling: I had a detailed discussion with the           

      patient and/or guardian regarding: the historical points, exam findings, and any            

      diagnostic results supporting the discharge/admit diagnosis, radiology results, the         

      need for outpatient follow up, a family practitioner, to return to the emergency            

      department if symptoms worsen or persist or if there are any questions or concerns that     

      arise at home.                                                                              

02:08 ED course: Resp even, unlabored and clear.                                              kb  

                                                                                                  

                                                                                             

23:35 Order name: Chest Pa And Lat (2 Views) XRAY                                             kb  

                                                                                             

23:35 Order name: IV Start; Complete Time: 23:54                                              kb  

                                                                                                  

Administered Medications:                                                                         

                                                                                             

23:35 Drug: DuoNeb (3:1) (2.5 mg - 0.5 mg) 3 ml Route: Nebulizer;                               

                                                                                             

00:30 Follow up: Response: No adverse reaction                                                [facility] 

                                                                                             

23:54 Drug: SOLU-Medrol 125 mg Route: IVP; Site: right antecubital;                           [facility] 

                                                                                             

00:50 Follow up: Response: No adverse reaction                                                [facility] 

                                                                                             

23:54 Drug: Magnesium Sulfate 2 grams Route: IVPB; Infused Over: 2 hrs; Site: right           [facility] 

      antecubital;                                                                                

                                                                                             

00:50 Follow up: Response: No adverse reaction; IV Status: Completed infusion                 [facility] 

                                                                                                  

                                                                                                  

Disposition:                                                                                      

19 01:56 Discharged to Home. Impression: Unspecified asthma with (acute) exacerbation.      

- Condition is Stable.                                                                            

- Discharge Instructions: Asthma, Adult, Easy-to-Read.                                            

                                                                                                  

- Medication Reconciliation Form, Thank You Letter, Antibiotic Education, Prescription            

  Opioid Use form.                                                                                

- Follow up: Emergency Department; When: As needed; Reason: Worsening of condition.               

  Follow up: Private Physician; When: 2 - 3 days; Reason: Recheck today's complaints,             

  Continuance of care, Re-evaluation by your physician.                                           

                                                                                                  

                                                                                                  

                                                                                                  

Addendum:                                                                                         

2019                                                                                        

     09:20 Co-signature as Attending Physician, Dioni Aguilera MD I agree with the assessment and  c
ha

           plan of care.                                                                          

                                                                                                  

Signatures:                                                                                       

Dispatcher [employer]                           EDMS                                                 

Courtney Hammonds, FNP-C                 FNP-Ckb                                                   

Dioni Aguilrea MD MD cha Chretien, Felicia, RN                   RN                                                      

Jed Treviño RN                    RN   jd3                                                  

                                                                                                  

Corrections: (The following items were deleted from the chart)                                    

                                                                                             

02:49 01:56 2019 01:56 Discharged to Home. Impression: Unspecified asthma with (acute)  jd3 

      exacerbation. Condition is Stable. Forms are Medication Reconciliation Form, Thank You      

      Letter, Antibiotic Education, Prescription Opioid Use. Follow up: Emergency Department;     

      When: As needed; Reason: Worsening of condition. Follow up: Private Physician; When: 2      

      - 3 days; Reason: Recheck today's complaints, Continuance of care, Re-evaluation by         

      your physician. kb                                                                          

                                                                                                  

**************************************************************************************************

## 2019-08-03 NOTE — ER
Nurse's Notes                                                                                     

 [facility]                                                                 

Name: Miles Verma                                                                             

Age: 23 yrs                                                                                       

Sex: Male                                                                                         

: 1995                                                                                   

MRN: [id number]                                                                                   

Arrival Date: 2019                                                                          

Time: 23:28                                                                                       

Account#: [id number]                                                                            

Bed 7                                                                                             

Private MD:                                                                                       

Diagnosis: Unspecified asthma with (acute) exacerbation                                           

                                                                                                  

Presentation:                                                                                     

                                                                                             

23:20 Presenting complaint: Patient states: that he was walking home and started to have      fc  

      asthma attack. Also states that he was spitting up blood.                                   

23:20 Acuity: JANAE 3                                                                             

23:20 Method Of Arrival: EMS: [facility]                                                      

23:39 Transition of care: patient was not received from another setting of care. Onset of     fc  

      symptoms was 2019. Risk Assessment: Do you want to hurt yourself or someone      

      else? Patient reports no desire to harm self or others. Initial Sepsis Screen: Does the     

      patient meet any 2 criteria? RR > 20 per min. HR > 90 bpm. Yes Does the patient have a      

      suspected source of infection? No. Patient's initial sepsis screen is negative. Care        

      prior to arrival: Medication(s) given: Albuterol Neb x 1, Atrovent Neb x 1.                 

                                                                                                  

Historical:                                                                                       

- Allergies:                                                                                      

23:43 NKA;                                                                                    fc  

- Home Meds:                                                                                      

23:43 Unable to obtain [Active];                                                              fc  

- PMHx:                                                                                           

23:43 Asthma;                                                                                 fc  

- PSHx:                                                                                           

23:43 None;                                                                                   fc  

                                                                                                  

- Immunization history:: Last tetanus immunization: unknown.                                      

- Social history:: Smoking status: Patient uses tobacco products, denies chronic                  

  smoking, but will smoke occasionally, Patient/guardian denies using alcohol, street             

  drugs.                                                                                          

- Ebola Screening: : Patient negative for fever greater than or equal to 101.5 degrees            

  Fahrenheit, and additional compatible Ebola Virus Disease symptoms Patient denies               

  exposure to infectious person Patient denies travel to an Ebola-affected area in the            

  21 days before illness onset.                                                                   

                                                                                                  

                                                                                                  

Screenin:20 Abuse screen: Denies threats or abuse. Nutritional screening: No deficits noted.        fc  

      Tuberculosis screening: No symptoms or risk factors identified. Fall Risk None              

      identified.                                                                                 

                                                                                                  

Assessment:                                                                                       

23:55 General: Appears uncomfortable, Behavior is cooperative, appropriate for age, anxious.  jd3 

      Pain: Denies pain. Neuro: Level of Consciousness is awake, alert, obeys commands,           

      Oriented to person, place, time, situation. Cardiovascular: Heart tones S1 S2 present       

      Capillary refill < 3 seconds Patient's skin is warm and dry. Respiratory: Airway is         

      patent Respiratory effort is even, labored, Respiratory pattern is symmetrical,             

      tachypnea Breath sounds with wheezes bilaterally. GI: No signs and/or symptoms were         

      reported involving the gastrointestinal system. : No signs and/or symptoms were           

      reported regarding the genitourinary system. EENT: No signs and/or symptoms were            

      reported regarding the EENT system. Derm: Skin is intact, Skin is dry, Skin is normal,      

      Skin temperature is warm. Musculoskeletal: Circulation, motion, and sensation intact.       

      Range of motion: intact in all extremities.                                                 

                                                                                             

00:36 Reassessment: Patient appears in no apparent distress at this time. Patient and/or      jd3 

      family updated on plan of care and expected duration. Pain level reassessed. A \T\ O X 4,   

      even, labored respirations, patient reports some relief from the shortness of breath.       

00:55 Reassessment: Patient appears in no apparent distress at this time. No changes from     jd3 

      previously documented assessment. Patient and/or family updated on plan of care and         

      expected duration. Pain level reassessed. reports feeling slightly better than before.      

01:45 Reassessment: Patient appears in no apparent distress at this time. Patient and/or      jd3 

      family updated on plan of care and expected duration. Pain level reassessed. Patient is     

      alert, oriented x 3, equal unlabored respirations, skin warm/dry/pink. Patient states       

      feeling better.                                                                             

01:50 Respiratory: Airway is patent Respiratory effort is even, unlabored, Respiratory        jd3 

      pattern is regular, symmetrical, Breath sounds are clear bilaterally.                       

02:45 Reassessment: Patient appears in no apparent distress at this time. Patient and/or      jd3 

      family updated on plan of care and expected duration. Pain level reassessed. Patient is     

      alert, oriented x 3, equal unlabored respirations, skin warm/dry/pink. reported             

      understanding of discharge instructions. Patient denies pain at this time. Patient          

      states feeling better. Patient states symptoms have improved.                               

                                                                                                  

Vital Signs:                                                                                      

                                                                                             

23:20  / 93; Pulse 114; Resp 32; Temp 98.2(O); Pulse Ox 99% on R/A; Weight 79.38 kg     fc  

      (R); Height 5 ft. 7 in. (170.18 cm) (R); Pain 9/10;                                         

                                                                                             

00:00  / 86; Pulse 111; Resp 25 S; Pulse Ox 100% on Nebulizer Mask;                     jd3 

00:56  / 75; Pulse 96; Resp 25 S; Pulse Ox 98% on Non-rebreather mask;                  jd3 

01:30  / 79; Pulse 92; Resp 20 S; Pulse Ox 97% on R/A;                                  jd3 

02:48  / 84; Pulse 89; Resp 17 S; Pulse Ox 97% on R/A;                                  jd3 

                                                                                             

23:20 Body Mass Index 27.41 (79.38 kg, 170.18 cm)                                               

                                                                                                  

ED Course:                                                                                        

                                                                                             

23:20 Arm band placed on Patient placed in a hallway bed, on a stretcher.                     fc  

23:20 Patient has correct armband on for positive identification. Bed in low position. Call   fc  

      light in reach. Side rails up X2. Pulse ox on. NIBP on.                                     

23:20 No provider procedures requiring assistance completed.                                  fc  

23:28 Patient arrived in ED.                                                                  ds1 

23:31 Dioni Aguilera MD is Attending Physician.                                             ervin 

23:33 Courtney Hammonds FNP-C is PHCP.                                                        kb  

23:40 Jed Treviño RN is Primary Nurse.                                                  jd3 

23:41 Triage completed.                                                                       fc  

23:50 Inserted saline lock: 20 gauge in right antecubital area, using aseptic technique.      jd3 

                                                                                             

00:34 Patient moved to radiology via wheelchair.                                              mh1 

00:35 X-ray completed. Patient tolerated procedure well. Patient moved back from radiology.   mh1 

00:39 Chest Pa And Lat (2 Views) XRAY In Process Unspecified.                                 EDMS

02:46 IV discontinued, intact, bleeding controlled, No redness/swelling at site. Pressure     jd3 

      dressing applied.                                                                           

                                                                                                  

Administered Medications:                                                                         

                                                                                             

23:35 Drug: DuoNeb (3:1) (2.5 mg - 0.5 mg) 3 ml Route: Nebulizer;                               

                                                                                             

00:30 Follow up: Response: No adverse reaction                                                [facility] 

                                                                                             

23:54 Drug: SOLU-Medrol 125 mg Route: IVP; Site: right antecubital;                           [facility] 

                                                                                             

00:50 Follow up: Response: No adverse reaction                                                [facility] 

                                                                                             

23:54 Drug: Magnesium Sulfate 2 grams Route: IVPB; Infused Over: 2 hrs; Site: right           jd3 

      antecubital;                                                                                

                                                                                             

00:50 Follow up: Response: No adverse reaction; IV Status: Completed infusion                 jd3 

                                                                                                  

                                                                                                  

Outcome:                                                                                          

01:56 Discharge ordered by MD. alcocer  

02:46 Discharged to home ambulatory, with friend.                                             jd3 

02:46 Condition: stable                                                                           

02:46 Discharge instructions given to patient, Instructed on discharge instructions, follow       

      up and referral plans. Demonstrated understanding of instructions, follow-up care.          

02:49 Patient left the ED.                                                                    jd3 

                                                                                                  

Signatures:                                                                                       

Dispatcher [employer]                           EDMS                                                 

Courtney Hammonds, FNP-C                 FNP-Ckb                                                   

Dioni Aguilera MD MD cha Harvey, Martha                               1                                                  

Monique Newton RN                   RN   Sarah Curry                                1                                                  

Jed Treviño RN                    RN   jd3                                                  

                                                                                                  

Corrections: (The following items were deleted from the chart)                                    

                                                                                             

23:42 23:39 Presenting complaint: Patient states: that he was walking home and started to       

      have asthma attack. Also states that he was spitting up blood.                            

23:42 23:39 Method Of Arrival: EMS: [facility]  

23:42 23:39 Acuity: JANAE 3 [facility]  

                                                                                             

02:48  23:30 Inserted saline lock: 20 gauge in right antecubital area, using aseptic     jd3 

      technique. jd3                                                                              

                                                                                                  

**************************************************************************************************

## 2019-08-03 NOTE — RAD REPORT
EXAM DESCRIPTION:  Gregoria Luther (2 Views)8/3/2019 12:38 am

 

CLINICAL HISTORY:  sob

 

COMPARISON:  2018

 

FINDINGS:   Lungs are mildly hyperaerated.

 

The lungs appear clear of acute infiltrate. The heart is normal size

 

IMPRESSION:   No acute abnormalities displayed

## 2020-06-25 NOTE — ER
Nurse's Notes                                                                                     

 [facility]                                                                

Name: Miles Verma                                                                             

Age: 22 yrs                                                                                       

Sex: Male                                                                                         

: 1995                                                                                   

MRN: [id number]                                                                                   

Arrival Date: 2018                                                                          

Time: 23:31                                                                                       

Account#: [id number]                                                                            

Bed 25                                                                                            

Private MD:                                                                                       

Diagnosis: Dyspnea;Asthma;Cough                                                                   

                                                                                                  

Presentation:                                                                                     

                                                                                             

23:31 Presenting complaint: EMS states: HE CALLED FROM [place] AND SAID HE WAS HAVING AN   bp  

      ASTHMA ATTACK. Transition of care: patient was not received from another setting of         

      care. Onset of symptoms is unknown. Risk Assessment: Do you want to hurt yourself or        

      someone else? Patient reports no desire to harm self or others. Initial Sepsis Screen:      

      Does the patient meet any 2 criteria? No. Patient's initial sepsis screen is negative.      

      Does the patient have a suspected source of infection? No. Patient's initial sepsis         

      screen is negative. Care prior to arrival: Medication(s) given: Albuterol Neb x 1,          

      Atrovent Neb x 1.                                                                           

23:31 Method Of Arrival: EMS: [facility]                                                       bp  

23:31 Acuity: JANAE 4                                                                           bp  

                                                                                                  

Triage Assessment:                                                                                

23:33 General: Appears in no apparent distress. comfortable, Behavior is calm, cooperative,   bp  

      appropriate for age. Pain: Denies pain. Respiratory: Reports shortness of breath PT         

      MAKING LARYNGEAL NOISES, BUT NO OBJECTIVE FINDINGS NOTED Onset: The symptoms/episode        

      began/occurred suddenly, the patient reports symptoms have resolved.                        

                                                                                                  

Historical:                                                                                       

- Allergies:                                                                                      

23:33 NKA;                                                                                    bp  

- Home Meds:                                                                                      

23:33 None [Active];                                                                          bp  

- PMHx:                                                                                           

23:33 None;                                                                                   bp  

                                                                                                  

- Immunization history:: Adult Immunizations up to date.                                          

- Social history:: Smoking status: Patient uses tobacco products, denies chronic                  

  smoking, but will smoke occasionally, Patient uses street drugs, marijuana.                     

- Ebola Screening: : Patient negative for fever greater than or equal to 101.5 degrees            

  Fahrenheit, and additional compatible Ebola Virus Disease symptoms Patient denies               

  exposure to infectious person Patient denies travel to an Ebola-affected area in the            

  21 days before illness onset No symptoms or risks identified at this time.                      

- Family history:: not pertinent.                                                                 

                                                                                                  

                                                                                                  

Screenin:35 Abuse screen: Denies threats or abuse. Denies injuries from another. Nutritional        bp  

      screening: No deficits noted. Tuberculosis screening: No symptoms or risk factors           

      identified. Fall Risk None identified.                                                      

                                                                                                  

Assessment:                                                                                       

23:35 General: Appears in no apparent distress. comfortable, Behavior is calm, cooperative,   bp  

      appropriate for age. Pain: Denies pain. Unable to use pain scale. Neuro: Level of           

      Consciousness is awake, alert, obeys commands, Oriented to person, place, time,             

      situation, Appropriate for age. Cardiovascular: Rhythm is sinus rhythm. Respiratory:        

      Airway is patent Respiratory effort is even, unlabored, Respiratory pattern is regular,     

      symmetrical, Breath sounds are clear bilaterally.                                           

                                                                                             

00:09 Reassessment: Pt up walking down hallway. Asked if he wanted to stay and get his        fc  

      medication. Pt replied "no I'm leaving". He was asked again by Devin Carrero and pt continued     

      to walk out. Dr Aguilera notified.                                                          

                                                                                                  

Vital Signs:                                                                                      

                                                                                             

23:33  / 77; Pulse 66; Resp 16; Temp 97.9; Pulse Ox 99% ; Weight 77.11 kg;              bp  

                                                                                                  

ED Course:                                                                                        

23:31 Patient arrived in ED.                                                                  bp  

23:33 Triage completed.                                                                       bp  

23:33 Arm band placed on.                                                                     bp  

23:35 Patient has correct armband on for positive identification. Bed in low position. Call   bp  

      light in reach. Side rails up X2.                                                           

23:43 Dioni Aguilera MD is Attending Physician.                                             ervin 

23:52 Armin Capellan, RN is Primary Nurse.                                                     mb3 

                                                                                             

00:00 Patient moved to radiology via wheelchair.                                              kw  

00:01 X-ray completed. Patient tolerated procedure well.                                      kw  

00:01 Patient moved back from radiology.                                                      kw  

00:02 Chest Pa And Lat (2 Views) XRAY In Process Unspecified.                                 EDMS

                                                                                                  

Administered Medications:                                                                         

00:11 Not Given (Patient Eloped): Albuterol - atroVENT (3:1) (2.5 mg - 0.5 mg) 3 ml Nebulizer fc  

      once                                                                                        

00:11 Not Given (Patient Eloped): predniSONE 60 mg PO once                                    fc  

00:11 Not Given (Patient Eloped): Zithromax 500 mg PO once                                    fc  

                                                                                                  

                                                                                                  

Outcome:                                                                                          

00:12 Discharge ordered by MD.                                                                ervin 

00:27 Patient left the ED.                                                                    mb3 

                                                                                                  

Signatures:                                                                                       

Dispatcher [employer]                           EDMS                                                 

Dioni Aguilera MD MD cha Chretien, Felicia, RN RN                                                      

Jeannette Warner Brian, RN                      RN                                                      

Capellan, Armin, RN                       RN   mb3                                                  

                                                                                                  

************************************************************************************************** No significant past surgical history

## 2020-09-10 ENCOUNTER — HOSPITAL ENCOUNTER (EMERGENCY)
Dept: HOSPITAL 97 - ER | Age: 25
Discharge: HOME | End: 2020-09-10
Payer: SELF-PAY

## 2020-09-10 VITALS — SYSTOLIC BLOOD PRESSURE: 116 MMHG | TEMPERATURE: 98.6 F | DIASTOLIC BLOOD PRESSURE: 79 MMHG

## 2020-09-10 VITALS — OXYGEN SATURATION: 99 %

## 2020-09-10 DIAGNOSIS — J45.901: Primary | ICD-10-CM

## 2020-09-10 PROCEDURE — 99284 EMERGENCY DEPT VISIT MOD MDM: CPT

## 2020-09-10 NOTE — EDPHYS
Physician Documentation                                                                           

 [facility]                                                                 

Name: Miles Verma                                                                             

Age: 24 yrs                                                                                       

Sex: Male                                                                                         

: 1995                                                                                   

MRN: [id number]                                                                                   

Arrival Date: 09/10/2020                                                                          

Time: 00:25                                                                                       

Account#: [id number]                                                                            

Bed 17                                                                                            

Private MD:                                                                                       

ED Physician Tl Clayton                                                                      

HPI:                                                                                              

09/10                                                                                             

00:49 This 24 yrs old Black Male presents to ER via EMS with complaints of Asthma Attack.     [facility] 

00:49 The patient presents to the emergency department with wheezing, Current therapy:        7 

      albuterol inhaler, that began without any particular precipitating event, the patient       

      was reported to have audible wheezing, trouble breathing, Pre-hospital care: med neb,       

      Atrovent. Onset: The symptoms/episode began/occurred today. Modifying factors: The          

      symptoms are alleviated by nebulizer treatment, the symptoms are aggravated by              

      exertion. Associated signs and symptoms: Pertinent negatives: chest pain, choking,          

      fever, headache, nausea, palpitations, rash, vomiting.                                      

00:50 Severity of symptoms: At their worst the symptoms were moderate today, in the emergency [facility] 

      department the symptoms have improved moderately. The patient has experienced similar       

      episodes in the past, multiple times.                                                       

                                                                                                  

Historical:                                                                                       

- Allergies:                                                                                      

00:31 NKA;                                                                                    ea  

- PMHx:                                                                                           

00:31 Asthma;                                                                                 ea  

- PSHx:                                                                                           

00:31 None;                                                                                   ea  

                                                                                                  

- Immunization history:: Adult Immunizations up to date.                                          

- Social history:: Smoking status: unknown.                                                       

                                                                                                  

                                                                                                  

ROS:                                                                                              

00:50 Constitutional: Negative for fever, chills, and weight loss, Eyes: Negative for injury, mh7 

      pain, redness, and discharge, ENT: Negative for injury, pain, and discharge, Neck:          

      Negative for injury, pain, and swelling, Cardiovascular: Negative for chest pain,           

      palpitations, and edema, Abdomen/GI: Negative for abdominal pain, nausea, vomiting,         

      diarrhea, and constipation, Back: Negative for injury and pain, : Negative for            

      injury, bleeding, discharge, and swelling, MS/Extremity: Negative for injury and            

      deformity, Skin: Negative for injury, rash, and discoloration, Neuro: Negative for          

      headache, weakness, numbness, tingling, and seizure, Psych: Negative for depression,        

      anxiety, suicide ideation, homicidal ideation, and hallucinations, Allergy/Immunology:      

      Negative for hives, rash, and allergies, Endocrine: Negative for neck swelling,             

      polydipsia, polyuria, polyphagia, and marked weight changes, Hematologic/Lymphatic:         

      Negative for swollen nodes, abnormal bleeding, and unusual bruising.                        

                                                                                                  

Exam:                                                                                             

00:50 Constitutional:  This is a well developed, well nourished patient who is awake, alert,  mh7 

      and in no acute distress. Head/Face:  Normocephalic, atraumatic. Neck:  Trachea             

      midline, no thyromegaly or masses palpated, and no cervical lymphadenopathy.  Supple,       

      full range of motion without nuchal rigidity, or vertebral point tenderness.  No            

      Meningismus. Chest/axilla:  Normal chest wall appearance and motion.  Nontender with no     

      deformity.  No lesions are appreciated. Cardiovascular:  Regular rate and rhythm with a     

      normal S1 and S2.  No gallops, murmurs, or rubs.  Normal PMI, no JVD.  No pulse             

      deficits.                                                                                   

00:50 Abdomen/GI:  Soft, non-tender, with normal bowel sounds.  No distension or tympany.  No     

      guarding or rebound.  No evidence of tenderness throughout.                                 

00:50 Back:  No spinal tenderness.  No costovertebral tenderness.  Full range of motion.          

      Skin:  Warm, dry with normal turgor.  Normal color with no rashes, no lesions, and no       

      evidence of cellulitis. Neuro:  Awake and alert, GCS 15, oriented to person, place,         

      time, and situation.  Cranial nerves II-XII grossly intact.  Motor strength 5/5 in all      

      extremities.  Sensory grossly intact.  Cerebellar exam normal.  Normal gait. Psych:         

      Awake, alert, with orientation to person, place and time.  Behavior, mood, and affect       

      are within normal limits.                                                                   

00:50 Respiratory: the patient does not display signs of respiratory distress,  Respirations:     

      prolonged exhalation, that is mild, Breath sounds: wheezing: expiratory that is mild,       

      is heard diffusely, Respiratory rate:  20                                                   

00:53 MS/ Extremity:  Pulses equal, no cyanosis.  Neurovascular intact.  Full, normal range   mh7 

      of motion.                                                                                  

                                                                                                  

Vital Signs:                                                                                      

00:27  / 75; Pulse 110; Resp 20; Temp 98.8; Pulse Ox 96% on R/A; Weight 72.57 kg;       ea  

      Height 5 ft. 9 in. (175.26 cm);                                                             

01:15  / 72; Pulse 92; Resp 18; Pulse Ox 99% on Nebulizer Mask;                         ea  

02:40  / 79; Pulse 90; Resp 18; Temp 98.6; Pulse Ox 99% on R/A;                         ea  

00:27 Body Mass Index 23.63 (72.57 kg, 175.26 cm)                                             ea  

                                                                                                  

MDM:                                                                                              

00:42 Patient medically screened.                                                             [facility] 

02:30 Differential diagnosis: acute asthma, exercise-induced asthma, reactive airway. Data    [facility] 

      reviewed: vital signs, nurses notes, old medical records. Data interpreted: Pulse           

      oximetry: on room air is 99 %. Interpretation: normal. Counseling: I had a detailed         

      discussion with the patient and/or guardian regarding: the historical points, exam          

      findings, and any diagnostic results supporting the discharge/admit diagnosis, the need     

      for outpatient follow up, to return to the emergency department if symptoms worsen or       

      persist or if there are any questions or concerns that arise at home. Response to           

      treatment: the patient's symptoms have resolved after treatment, the patient's blood        

      pressure is in an acceptable range, mental status has returned to baseline, the patient     

      no longer shows bradycardia, the patient is not short of breath, the patient is not         

      tachycardic, the patient's pain is gone, the patient's temperature has normalized, the      

      patient is now symptom free, patient is well hydrated.                                      

                                                                                                  

Administered Medications:                                                                         

00:46 Drug: Albuterol - atroVENT (3:1) (2.5 mg - 0.5 mg) 3 ml Route: Nebulizer;               ea  

02:41 Follow up: Response: No adverse reaction                                                  

00:46 Drug: predniSONE 60 mg Route: PO;                                                       ea  

02:42 Follow up: Response: No adverse reaction                                                ea  

                                                                                                  

                                                                                                  

Disposition:                                                                                      

09/10/20 02:33 Discharged to Home. Impression: Asthma Exacerbation.                               

- Condition is Stable.                                                                            

- Discharge Instructions: Asthma, Adult, Easy-to-Read.                                            

- Prescriptions for Prednisone 20 mg Oral Tablet - take 2 tablet by ORAL route once               

  daily for 5 days; 10 tablet. Albuterol Sulfate 90 mcg/actuation - inhale 1-2 puff by            

  INHALATION route every 4-6 hours; 1 Inhaler.                                                    

- Medication Reconciliation Form, Thank You Letter, Antibiotic Education, Prescription            

  Opioid Use form.                                                                                

- Follow up: Private Physician; When: 1 - 2 days; Reason: Worsening of condition,                 

  Recheck today's complaints, Continuance of care, Re-evaluation by your physician.               

- Problem is an acute exacerbation.                                                               

- Symptoms have improved.                                                                         

                                                                                                  

                                                                                                  

                                                                                                  

Signatures:                                                                                       

Brandy Lopez RN                      Tl Gamboa ea, MD MD   mh7                                                  

                                                                                                  

Corrections: (The following items were deleted from the chart)                                    

02:42 02:33 09/10/2020 02:33 Discharged to Home. Impression: Asthma Exacerbation. Condition   ea  

      is Stable. Forms are Medication Reconciliation Form, Thank You Letter, Antibiotic           

      Education, Prescription Opioid Use. Follow up: Private Physician; When: 1 - 2 days;         

      Reason: Worsening of condition, Recheck today's complaints, Continuance of care,            

      Re-evaluation by your physician. Problem is an acute exacerbation. Symptoms have            

      improved. mh7                                                                               

                                                                                                  

**************************************************************************************************

## 2020-09-10 NOTE — ER
Nurse's Notes                                                                                     

 [facility]                                                                 

Name: Miles Verma                                                                             

Age: 24 yrs                                                                                       

Sex: Male                                                                                         

: 1995                                                                                   

MRN: [id number]                                                                                   

Arrival Date: 09/10/2020                                                                          

Time: 00:25                                                                                       

Account#: [id number]                                                                            

Bed 17                                                                                            

Private MD:                                                                                       

Diagnosis: Asthma Exacerbation                                                                    

                                                                                                  

Presentation:                                                                                     

09/10                                                                                             

00:27 Chief complaint: EMS states: Reports pt started having an asthma attack about thirty    ea  

      minutes ago, EMS reports giving an A\T\A treatment with some improvement. Coronavirus       

      screen: At this time, the client does not indicate any symptoms associated with             

      coronavirus-19. Ebola Screen: No symptoms or risks identified at this time. Initial         

      Sepsis Screen: Does the patient meet any 2 criteria? No. Patient's initial sepsis           

      screen is negative. Does the patient have a suspected source of infection? No.              

      Patient's initial sepsis screen is negative. Risk Assessment: Do you want to hurt           

      yourself or someone else? Patient reports no desire to harm self or others. Onset of        

      symptoms was September 10, 2020.                                                            

00:27 Method Of Arrival: EMS: [facility]                                                    ea  

00:27 Acuity: JANAE 3                                                                           ea  

                                                                                                  

Triage Assessment:                                                                                

00:33 General: Appears in no apparent distress. Behavior is appropriate for age. Pain: Denies ea  

      pain.                                                                                       

                                                                                                  

Historical:                                                                                       

- Allergies:                                                                                      

00:31 NKA;                                                                                    ea  

- PMHx:                                                                                           

00:31 Asthma;                                                                                 ea  

- PSHx:                                                                                           

00:31 None;                                                                                   ea  

                                                                                                  

- Immunization history:: Adult Immunizations up to date.                                          

- Social history:: Smoking status: unknown.                                                       

                                                                                                  

                                                                                                  

Screenin:30 Abuse screen: Denies threats or abuse. Nutritional screening: No deficits noted.        ea  

      Tuberculosis screening: No symptoms or risk factors identified. Fall Risk None              

      identified.                                                                                 

                                                                                                  

Assessment:                                                                                       

00:33 General: Appears in no apparent distress. Behavior is appropriate for age. Pain: Denies ea  

      pain. Neuro: Level of Consciousness is awake, alert, obeys commands, Oriented to            

      person, place, time, situation. Cardiovascular: Patient's skin is warm and dry.             

      Respiratory: Airway is patent Respiratory effort is even, unlabored, Respiratory            

      pattern is regular, symmetrical, Breath sounds with wheezes bilaterally. Derm: Skin is      

      pink, warm \T\ dry.                                                                         

01:14 Reassessment: Patient and/or family updated on plan of care and expected duration. Pain ea  

      level reassessed. Pt resting with eyes closed, respirations even and unlabored. Chest       

      expansions even and symmetrical.                                                            

02:41 Reassessment: Patient and/or family updated on plan of care and expected duration. Pain ea  

      level reassessed. Patient is alert, oriented x 3, equal unlabored respirations, skin        

      warm/dry/pink. Discharge instruction given to patient, verbalized the understanding of      

      instruction. Pt left ED ambulatory tolerating well. Patient states feeling better.          

                                                                                                  

Vital Signs:                                                                                      

00:27  / 75; Pulse 110; Resp 20; Temp 98.8; Pulse Ox 96% on R/A; Weight 72.57 kg;       ea  

      Height 5 ft. 9 in. (175.26 cm);                                                             

01:15  / 72; Pulse 92; Resp 18; Pulse Ox 99% on Nebulizer Mask;                         ea  

02:40  / 79; Pulse 90; Resp 18; Temp 98.6; Pulse Ox 99% on R/A;                         ea  

00:27 Body Mass Index 23.63 (72.57 kg, 175.26 cm)                                             ea  

                                                                                                  

ED Course:                                                                                        

00:25 Patient arrived in ED.                                                                  vc  

00:27 Brandy Lopez, MARCE is Primary Nurse.                                                    ea  

00:29 Triage completed.                                                                       ea  

00:30 Patient has correct armband on for positive identification. Bed in low position. Call   ea  

      light in reach. Side rails up X2.                                                           

00:30 Arm band placed on right wrist. Patient placed in an exam room, on a stretcher, on      ea  

      pulse oximetry.                                                                             

00:32 Tl Clayton MD is Attending Physician.                                             [facility] 

02:40 No provider procedures requiring assistance completed. Patient did not have IV access   ea  

      during this emergency room visit.                                                           

                                                                                                  

Administered Medications:                                                                         

00:46 Drug: Albuterol - atroVENT (3:1) (2.5 mg - 0.5 mg) 3 ml Route: Nebulizer;               ea  

02:41 Follow up: Response: No adverse reaction                                                ea  

00:46 Drug: predniSONE 60 mg Route: PO;                                                       ea  

02:42 Follow up: Response: No adverse reaction                                                ea  

                                                                                                  

                                                                                                  

Outcome:                                                                                          

02:33 Discharge ordered by MD.                                                                [facility] 

02:40 Discharged to home ambulatory.                                                          ea  

02:40 Condition: stable                                                                           

02:40 Discharge instructions given to patient, Instructed on discharge instructions, follow       

      up and referral plans. medication usage, Demonstrated understanding of instructions,        

      follow-up care, medications, Prescriptions given X 2.                                       

02:42 Patient left the ED.                                                                    ea  

                                                                                                  

Signatures:                                                                                       

Brandy Lopez, RN                      RN   Millicent Cole, RN                    RN   Tl Pacheco MD MD   [id number]                                                  

                                                                                                  

**************************************************************************************************

## 2020-09-18 ENCOUNTER — HOSPITAL ENCOUNTER (EMERGENCY)
Dept: HOSPITAL 97 - ER | Age: 25
Discharge: HOME | End: 2020-09-18
Payer: SELF-PAY

## 2020-09-18 VITALS — TEMPERATURE: 98.1 F

## 2020-09-18 VITALS — DIASTOLIC BLOOD PRESSURE: 63 MMHG | OXYGEN SATURATION: 98 % | SYSTOLIC BLOOD PRESSURE: 114 MMHG

## 2020-09-18 DIAGNOSIS — J45.901: Primary | ICD-10-CM

## 2020-09-18 PROCEDURE — 99284 EMERGENCY DEPT VISIT MOD MDM: CPT

## 2020-09-18 PROCEDURE — 96374 THER/PROPH/DIAG INJ IV PUSH: CPT

## 2020-09-18 NOTE — EDPHYS
Physician Documentation                                                                           

 [facility]                                                                 

Name: Miles Verma                                                                             

Age: 24 yrs                                                                                       

Sex: Male                                                                                         

: 1995                                                                                   

MRN: [id number]                                                                                   

Arrival Date: 2020                                                                          

Time: 03:04                                                                                       

Account#: [id number]                                                                            

Bed 6                                                                                             

Private MD:                                                                                       

ED Physician Jonathan Colon                                                                             

HPI:                                                                                              

                                                                                             

05:00 This 24 yrs old Black Male presents to ER via EMS with complaints of Asthma.            pkl 

05:00 The patient has shortness of breath at rest. Onset: The symptoms/episode began/occurred pkl 

      just prior to arrival. Associated signs and symptoms: The patient has no apparent           

      associated signs or symptoms. Patient said he has H/O asthma.                               

                                                                                                  

Historical:                                                                                       

- Allergies:                                                                                      

03:13 NKA;                                                                                    rr5 

- Home Meds:                                                                                      

03:13 None [Active];                                                                          rr5 

- PMHx:                                                                                           

03:13 Asthma;                                                                                 rr5 

- PSHx:                                                                                           

03:13 None;                                                                                   rr5 

                                                                                                  

- Immunization history:: Adult Immunizations unknown.                                             

- Social history:: Smoking status: unknown Patient/guardian denies using alcohol,                 

  street drugs, tobacco products.                                                                 

                                                                                                  

                                                                                                  

ROS:                                                                                              

05:00 Eyes: Negative for injury, pain, redness, and discharge, ENT: Negative for injury,      pkl 

      pain, and discharge, Neck: Negative for injury, pain, and swelling, Cardiovascular:         

      Negative for chest pain, palpitations, and edema.                                           

05:00 Respiratory: Positive for shortness of breath, wheezing.                                    

05:00 Abdomen/GI: Negative for abdominal pain, nausea, vomiting, and diarrhea.                    

05:00 Back: Negative for acute changes.                                                           

05:00 : Negative for urinary symptoms.                                                          

05:00 MS/extremity: Negative for acute changes.                                                   

05:00 Skin: Negative for rash.                                                                    

05:00 Neuro: Negative for altered mental status.                                                  

                                                                                                  

Exam:                                                                                             

05:00 Head/Face:  Normocephalic, atraumatic. Eyes:  Pupils equal round and reactive to light, pkl 

      extra-ocular motions intact.  Lids and lashes normal.  Conjunctiva and sclera are           

      non-icteric and not injected.  Cornea within normal limits.  Periorbital areas with no      

      swelling, redness, or edema. ENT:  Nares patent. No nasal discharge, no septal              

      abnormalities noted.  Tympanic membranes are normal and external auditory canals are        

      clear.  Oropharynx with no redness, swelling, or masses, exudates, or evidence of           

      obstruction, uvula midline.  Mucous membranes moist. Neck:  Trachea midline, no             

      thyromegaly or masses palpated, and no cervical lymphadenopathy.  Supple, full range of     

      motion without nuchal rigidity, or vertebral point tenderness.  No Meningismus.             

      Chest/axilla:  Normal chest wall appearance and motion.  Nontender with no deformity.       

      No lesions are appreciated. Cardiovascular:  Regular rate and rhythm with a normal S1       

      and S2.  No gallops, murmurs, or rubs.  Normal PMI, no JVD.  No pulse deficits.             

05:00 Respiratory: mild respiratory distress is noted,  Respirations: labored breathing, that     

      is mild, Breath sounds: bronchial sounds, that are moderate, are scattered, rhonchi,        

      that are moderate, are scattered.                                                           

05:00 Abdomen/GI: Bowel sounds: normal, Palpation: abdomen is soft and non-tender, in all         

      quadrants.                                                                                  

05:00 Back: Exam negative for acute changes.                                                      

05:00 : Exam negative for acute changes.                                                        

05:00 Musculoskeletal/extremity: Exam is negative for acute changes.                              

05:00 Skin: Exam negative for rash.                                                               

05:00 Neuro: Orientation: is normal, Mentation: is normal, Cranial nerves: grossly normal,        

      Motor: is normal.                                                                           

                                                                                                  

Vital Signs:                                                                                      

03:09  / 82; Pulse 70; Resp 28; Temp 98.1; Pulse Ox 100% ; Weight 77.11 kg; Height 5    rr5 

      ft. 9 in. (175.26 cm); Pain 8/10;                                                           

04:23  / 63; Pulse 84; Resp 20; Pulse Ox 98% ;                                          rr5 

03:09 Body Mass Index 25.10 (77.11 kg, 175.26 cm)                                             rr5 

                                                                                                  

MDM:                                                                                              

03:06 Patient medically screened.                                                             pkl 

05:00 Data reviewed: vital signs, nurses notes. ED course: Patient feeling better.            pkl 

      Asymptomatic.                                                                               

                                                                                                  

Administered Medications:                                                                         

03:30 Drug: SOLU-Medrol 125 mg Route: IVP; Site: left antecubital;                            jb4 

03:30 Drug: Albuterol - atroVENT (3:1) (2.5 mg - 0.5 mg) 3 ml Route: Nebulizer;               jb4 

                                                                                                  

                                                                                                  

Disposition:                                                                                      

20 05:05 Discharged to Home. Impression: Acute exacerbation asthma.                         

- Condition is Stable.                                                                            

                                                                                                  

- Prescriptions for Prednisone 20 mg Oral Tablet - take 1 tablet by ORAL route once               

  daily for 5 days; 5 tablet. Albuterol Sulfate 90 mcg/actuation - inhale 1-2 puff by             

  INHALATION route every 4-6 hours; 1 Inhaler.                                                    

- Medication Reconciliation Form, Thank You Letter, Antibiotic Education, Prescription            

  Opioid Use form.                                                                                

- Follow up: Private Physician; When: 2 - 3 days; Reason: Re-evaluation by your                   

  physician.                                                                                      

- Problem is new.                                                                                 

- Symptoms have improved.                                                                         

                                                                                                  

                                                                                                  

                                                                                                  

Signatures:                                                                                       

Jonathan Colon MD MD   pkl                                                  

Faustino Craig RN                       RN   jb4                                                  

Reji Wilkerson RN                      RN   rr5                                                  

                                                                                                  

Corrections: (The following items were deleted from the chart)                                    

05:20 05:05 2020 05:05 Discharged to Home. Impression: Acute exacerbation asthma.       jb4 

      Condition is Stable. Forms are Medication Reconciliation Form, Thank You Letter,            

      Antibiotic Education, Prescription Opioid Use. Follow up: Private Physician; When: 2 -      

      3 days; Reason: Re-evaluation by your physician. Problem is new. Symptoms have              

      improved. pkl                                                                               

                                                                                                  

**************************************************************************************************

## 2020-09-18 NOTE — ER
Nurse's Notes                                                                                     

 [facility]                                                                 

Name: Miles Verma                                                                             

Age: 24 yrs                                                                                       

Sex: Male                                                                                         

: 1995                                                                                   

MRN: [id number]                                                                                   

Arrival Date: 2020                                                                          

Time: 03:04                                                                                       

Account#: [id number]                                                                            

Bed 6                                                                                             

Private MD:                                                                                       

Diagnosis: Acute exacerbation asthma                                                              

                                                                                                  

Presentation:                                                                                     

                                                                                             

03:09 Chief complaint: EMS states: patient came from hi s friends house 30 minutes ago        rr5 

      suddenly he felt shortness of breath, he has history of asthma and it gets worse.           

      Coronavirus screen: Client denies travel out of the [place] in the last 14 days. shortness     

      of breath. Ebola Screen: Patient negative for fever greater than or equal to 101.5          

      degrees Fahrenheit, and additional compatible Ebola Virus Disease symptoms Patient          

      denies exposure to infectious person. Patient denies travel to an Ebola-affected area       

      in the 21 days before illness onset. Initial Sepsis Screen: Does the patient meet any 2     

      criteria? RR > 20 per min. No. Patient's initial sepsis screen is negative. Does the        

      patient have a suspected source of infection? No. Patient's initial sepsis screen is        

      negative. Risk Assessment: Do you want to hurt yourself or someone else? Patient            

      reports no desire to harm self or others. Onset of symptoms was 2020.         

      Care prior to arrival: Medication(s) given: Albuterol Neb albuterol and atrovent            

      nebulization and solumedrol 125mg given by EMS.                                             

03:09 Method Of Arrival: EMS: [facility]                                                rr5 

03:09 Acuity: JANAE 3                                                                           rr5 

                                                                                                  

Historical:                                                                                       

- Allergies:                                                                                      

03:13 NKA;                                                                                    rr5 

- Home Meds:                                                                                      

03:13 None [Active];                                                                          rr5 

- PMHx:                                                                                           

03:13 Asthma;                                                                                 rr5 

- PSHx:                                                                                           

03:13 None;                                                                                   rr5 

                                                                                                  

- Immunization history:: Adult Immunizations unknown.                                             

- Social history:: Smoking status: unknown Patient/guardian denies using alcohol,                 

  street drugs, tobacco products.                                                                 

                                                                                                  

                                                                                                  

Screenin:28 Abuse screen: Denies threats or abuse. Nutritional screening: No deficits noted.        jb4 

      Tuberculosis screening: No symptoms or risk factors identified. Fall Risk None              

      identified.                                                                                 

                                                                                                  

Assessment:                                                                                       

03:28 General: Appears in no apparent distress. uncomfortable, Behavior is calm, cooperative, jb4 

      appropriate for age. Pain: Denies pain. Neuro: Level of Consciousness is awake, alert,      

      obeys commands, Oriented to person, place, time, situation. Cardiovascular: Patient's       

      skin is warm and dry. Respiratory: Airway is patent Respiratory effort is even,             

      labored, Respiratory pattern is regular, symmetrical, Breath sounds with wheezes            

      bilaterally. GI: No signs and/or symptoms were reported involving the gastrointestinal      

      system. : No signs and/or symptoms were reported regarding the genitourinary system.      

      EENT: No signs and/or symptoms were reported regarding the EENT system. Derm: Skin is       

      intact, Skin is pink, warm \T\ dry. Musculoskeletal: Circulation, motion, and sensation     

      intact. Range of motion: intact in all extremities.                                         

04:25 Reassessment: Patient appears in no apparent distress at this time. Patient is alert,   rr5 

      oriented x 3, equal unlabored respirations, skin warm/dry/pink. Patient states feeling      

      better. Patient states symptoms have improved.                                              

05:19 Reassessment: Patient appears in no apparent distress at this time. Patient and/or      jb4 

      family updated on plan of care and expected duration. Pain level reassessed. Patient is     

      alert, oriented x 3, equal unlabored respirations, skin warm/dry/pink. PT verbalized        

      understanding of D/c and follow up instructions. Denies questions or concerns. Escorted     

      out of ED in police custody.                                                                

                                                                                                  

Vital Signs:                                                                                      

03:09  / 82; Pulse 70; Resp 28; Temp 98.1; Pulse Ox 100% ; Weight 77.11 kg; Height 5    rr5 

      ft. 9 in. (175.26 cm); Pain 8/10;                                                           

04:23  / 63; Pulse 84; Resp 20; Pulse Ox 98% ;                                          rr5 

03:09 Body Mass Index 25.10 (77.11 kg, 175.26 cm)                                             rr5 

                                                                                                  

ED Course:                                                                                        

03:04 Patient arrived in ED.                                                                  cl3 

03:06 Jonathan Colon MD is Attending Physician.                                                    pkl 

03:10 Maintain EMS IV. Dressing intact. Good blood return noted. Site clean \T\ dry. Gauge \T\    rr
5

      site: g18 left ac.                                                                          

03:13 Triage completed.                                                                       rr5 

03:13 Arm band placed on right wrist.                                                         rr5 

03:16 Reji Wilkerson RN is Primary Nurse.                                                    rr5 

03:28 Patient has correct armband on for positive identification. Bed in low position. Call   jb4 

      light in reach. Side rails up X 1. Pulse ox on. NIBP on.                                    

04:26 Initial Neb Treatment Given as ordered Patient tolerated procedure well without adverse rr5 

      effect.                                                                                     

05:19 No provider procedures requiring assistance completed. IV discontinued, intact,         jb4 

      bleeding controlled, No redness/swelling at site. Pressure dressing applied.                

                                                                                                  

Administered Medications:                                                                         

03:30 Drug: SOLU-Medrol 125 mg Route: IVP; Site: left antecubital;                            jb4 

03:30 Drug: Albuterol - atroVENT (3:1) (2.5 mg - 0.5 mg) 3 ml Route: Nebulizer;               jb4 

                                                                                                  

                                                                                                  

Outcome:                                                                                          

05:05 Discharge ordered by MD.                                                                pkl 

05:19 Discharged to Law Enforcement                                                           jb4 

05:19 Condition: stable                                                                           

05:19 Discharge instructions given to patient, Instructed on discharge instructions, follow       

      up and referral plans. medication usage, Demonstrated understanding of instructions,        

      follow-up care, medications, Prescriptions given X 2.                                       

05:20 Patient left the ED.                                                                    jb4 

                                                                                                  

Signatures:                                                                                       

Jonathan Colon MD MD   pkl                                                  

Faustino Craig RN                       RN   jb4                                                  

Reji Wilkerson RN                      RN   rr5                                                  

Zulema Casiano                                cl3                                                  

                                                                                                  

Corrections: (The following items were deleted from the chart)                                    

03:13 03:09  / 82; Pulse 70bpm; Resp 28bpm; Pulse Ox 98.1%; Temp 100F; 77.11 kg; Height rr5 

      5 ft. 9 in.; BMI: 25.1; Pain 8/10; rr5                                                      

                                                                                                  

**************************************************************************************************

## 2020-11-25 ENCOUNTER — HOSPITAL ENCOUNTER (EMERGENCY)
Dept: HOSPITAL 97 - ER | Age: 25
Discharge: HOME | End: 2020-11-25
Payer: SELF-PAY

## 2020-11-25 DIAGNOSIS — R06.00: Primary | ICD-10-CM

## 2020-11-25 PROCEDURE — 71045 X-RAY EXAM CHEST 1 VIEW: CPT

## 2020-11-25 PROCEDURE — 99284 EMERGENCY DEPT VISIT MOD MDM: CPT

## 2020-11-25 NOTE — ER
Nurse's Notes                                                                                     

 [facility]                                                                 

Name: Miles Verma                                                                             

Age: 25 yrs                                                                                       

Sex: Male                                                                                         

: 1995                                                                                   

MRN: [id number]                                                                                   

Arrival Date: 2020                                                                          

Time: 19:14                                                                                       

Account#: [id number]                                                                            

Bed 17                                                                                            

Private MD:                                                                                       

Diagnosis: Dyspnea                                                                                

                                                                                                  

Presentation:                                                                                     

                                                                                             

19:18 Chief complaint: Patient states: Called EMS for SOB. VSS for EMS. 98%RA. Coronavirus    ll1 

      screen: Client denies travel out of the [place] in the last 14 days. difficulty breathing,     

      Client presents with at least one sign or symptom that may indicate coronavirus-19.         

      Standard/surgical mask placed on the client. Ebola Screen: Patient denies travel to an      

      Ebola-affected area in the 21 days before illness onset. Risk Assessment: Do you want       

      to hurt yourself or someone else? Patient reports no desire to harm self or others.         

      Onset of symptoms is unknown.                                                               

19:18 Method Of Arrival: EMS: [facility]                                                    1 

19:18 Acuity: JANAE 3                                                                           ll1 

19:20 Initial Sepsis Screen: Does the patient meet any 2 criteria? RR > 20 per min. Yes Does  jb4 

      the patient have a suspected source of infection? No. Patient's initial sepsis screen       

      is negative.                                                                                

                                                                                                  

Historical:                                                                                       

- Allergies:                                                                                      

19:19 NKA;                                                                                    ll1 

- PMHx:                                                                                           

19:19 Asthma;                                                                                 ll1 

- PSHx:                                                                                           

19:19 None;                                                                                   ll1 

                                                                                                  

                                                                                                  

                                                                                                  

Screenin:20 Abuse screen: Denies threats or abuse. Nutritional screening: No deficits noted.        jb4 

      Tuberculosis screening: No symptoms or risk factors identified. Fall Risk None              

      identified.                                                                                 

                                                                                                  

Assessment:                                                                                       

19:20 General: Appears in no apparent distress. uncomfortable, Behavior is calm, cooperative, jb4 

      appropriate for age. Pain: Complains of pain in chest Pain does not radiate. Pain           

      currently is 4 out of 10 on a pain scale. Quality of pain is described as tightness.        

      Neuro: Level of Consciousness is awake, alert, obeys commands. Cardiovascular:              

      Patient's skin is warm and dry. Respiratory: Airway is patent Respiratory effort is         

      even, unlabored, Respiratory pattern is regular, symmetrical, Breath sounds with            

      wheezes bilaterally. GI: No signs and/or symptoms were reported involving the               

      gastrointestinal system. : No signs and/or symptoms were reported regarding the           

      genitourinary system. EENT: No signs and/or symptoms were reported regarding the EENT       

      system. Derm: Skin is intact, Skin is pink, warm \T\ dry. Musculoskeletal: Circulation,     

      motion, and sensation intact. Range of motion: intact in all extremities.                   

20:30 Reassessment: Patient appears in no apparent distress at this time. Patient and/or      jb4 

      family updated on plan of care and expected duration. Pain level reassessed. Patient is     

      alert, oriented x 3, equal unlabored respirations, skin warm/dry/pink. Patient states       

      feeling better. Patient states symptoms have improved.                                      

20:30 Respiratory: Airway is patent Respiratory effort is even, unlabored, Respiratory        jb4 

      pattern is regular, symmetrical, Breath sounds are clear bilaterally.                       

                                                                                                  

Vital Signs:                                                                                      

19:20  / 93; Pulse 77; Resp 21; Temp 98.3(O); Pulse Ox 100% on R/A; Pain 4/10;          jb4 

20:30  / 89; Pulse 64; Resp 16; Pulse Ox 100% on R/A;                                   jb4 

                                                                                                  

ED Course:                                                                                        

19:14 Patient arrived in ED.                                                                  cl3 

19:15 [facility]                                                        kb  

19:15 Rebel Armando MD is Attending Physician.                                            kb  

19:19 Triage completed.                                                                       ll1 

19:19 Arm band placed on Patient placed in an exam room, on a stretcher.                      ll1 

19:20 Patient has correct armband on for positive identification. Bed in low position. Call   jb4 

      light in reach. Side rails up X 1. Pulse ox on. NIBP on.                                    

19:23 Faustino Craig, RN is Primary Nurse.                                                     jb4 

19:56 Chest Single View XRAY In Process Unspecified.                                          EDMS

20:54 No provider procedures requiring assistance completed. Patient did not have IV access   jb4 

      during this emergency room visit.                                                           

                                                                                                  

Administered Medications:                                                                         

19:31 Drug: Albuterol 2.5 mg Route: Inhalation;                                               jb4 

20:30 Follow up: Response: Wheezing diminished                                                jb4 

19:31 Drug: AtroVENT Aerosol 0.5 mg Route: Inhalation;                                        jb4 

20:56 Follow up: Response: No adverse reaction; Wheezing diminished                           jb4 

                                                                                                  

                                                                                                  

Outcome:                                                                                          

20:43 Discharge ordered by MD.                                                                kb  

20:54 Discharged to home ambulatory.                                                          jb4 

20:54 Condition: stable                                                                           

20:54 Discharge instructions given to patient, Instructed on discharge instructions, follow       

      up and referral plans. medication usage, Demonstrated understanding of instructions,        

      follow-up care, medications, Prescriptions given X 1.                                       

20:57 Patient left the ED.                                                                    jb4 

                                                                                                  

Signatures:                                                                                       

Dispatcher [employer]                           EDCourtney Mckenzie, KISHOR-C                 MEGANP-Faustino Ramirez RN                       RN   jb4                                                  

Zulema Casiano                                cl3                                                  

Alvaro Casiano RN                       RN   ll1                                                  

                                                                                                  

**************************************************************************************************

## 2020-11-25 NOTE — RAD REPORT
EXAM DESCRIPTION:  RAD - Chest Single View - 11/25/2020 7:56 pm

 

CLINICAL HISTORY:  DYSPNEA

 

COMPARISON:  August 2019

 

TECHNIQUE:  AP portable chest image was obtained 11/25/2020 7:56 pm .

 

FINDINGS:  Lungs are clear. Heart and vasculature are normal. No measurable pleural effusion and no p
neumothorax. No acute bony abnormality seen. No acute aortic findings suspected.

 

IMPRESSION:  No acute cardiopulmonary process.

 

No significant change from comparison study.

## 2020-11-25 NOTE — EDPHYS
Physician Documentation                                                                           

 [facility]                                                                 

Name: Miles Verma                                                                             

Age: 25 yrs                                                                                       

Sex: Male                                                                                         

: 1995                                                                                   

MRN: [id number]                                                                                   

Arrival Date: 2020                                                                          

Time: 19:14                                                                                       

Account#: [id number]                                                                            

Bed 17                                                                                            

Private MD:                                                                                       

ED Physician Rebel Armando                                                                     

HPI:                                                                                              

                                                                                             

21:19 This 25 yrs old Black Male presents to ER via EMS with complaints of Shortness Of       kb  

      Breath.                                                                                     

21:19 The patient has shortness of breath at rest. Onset: The symptoms/episode began/occurred kb  

      30 minute(s) ago. Duration: The symptoms are continuous. The patient's shortness of         

      breath has no apparent modifying factors. Associated signs and symptoms: Pertinent          

      positives: non-productive cough, Pertinent negatives: chest pain, fever. Severity of        

      symptoms: At their worst the symptoms were moderate in the emergency department the         

      symptoms are unchanged. The patient has not experienced similar symptoms in the past.       

      The patient has not recently seen a physician.                                              

                                                                                                  

Historical:                                                                                       

- Allergies:                                                                                      

19:19 NKA;                                                                                    ll1 

- PMHx:                                                                                           

19:19 Asthma;                                                                                 ll1 

- PSHx:                                                                                           

19:19 None;                                                                                   ll1 

                                                                                                  

                                                                                                  

                                                                                                  

ROS:                                                                                              

21:18 Constitutional: Negative for fever, chills, and weight loss, Cardiovascular: Negative   kb  

      for chest pain, palpitations, and edema, Abdomen/GI: Negative for abdominal pain,           

      nausea, vomiting, diarrhea, and constipation, Back: Negative for injury and pain,           

      MS/Extremity: Negative for injury and deformity, Skin: Negative for injury, rash, and       

      discoloration, Neuro: Negative for headache, weakness, numbness, tingling, and seizure.     

21:18 Respiratory: Positive for shortness of breath, Negative for cough, dyspnea on exertion,     

      hemoptysis, orthopnea, pleurisy, sputum production, wheezing.                               

                                                                                                  

Exam:                                                                                             

21:18 Constitutional:  This is a well developed, well nourished patient who is awake, alert,  kb  

      and in no acute distress. Head/Face:  Normocephalic, atraumatic. Chest/axilla:  Normal      

      chest wall appearance and motion.  Nontender with no deformity.  No lesions are             

      appreciated. Cardiovascular:  Regular rate and rhythm with a normal S1 and S2.  No          

      gallops, murmurs, or rubs.  Normal PMI, no JVD.  No pulse deficits. Abdomen/GI:  Soft,      

      non-tender, with normal bowel sounds.  No distension or tympany.  No guarding or            

      rebound.  No evidence of tenderness throughout. Skin:  Warm, dry with normal turgor.        

      Normal color with no rashes, no lesions, and no evidence of cellulitis. MS/ Extremity:      

      Pulses equal, no cyanosis.  Neurovascular intact.  Full, normal range of motion. Neuro:     

       Awake and alert, GCS 15, oriented to person, place, time, and situation.  Cranial          

      nerves II-XII grossly intact.  Motor strength 5/5 in all extremities.  Sensory grossly      

      intact.  Cerebellar exam normal.  Normal gait.                                              

21:18 Respiratory: the patient does not display signs of respiratory distress,  Respirations:     

      normal, Breath sounds: wheezing: inspiratory that is mild, is scattered.                    

                                                                                                  

Vital Signs:                                                                                      

19:20  / 93; Pulse 77; Resp 21; Temp 98.3(O); Pulse Ox 100% on R/A; Pain 4/10;          jb4 

20:30  / 89; Pulse 64; Resp 16; Pulse Ox 100% on R/A;                                   jb4 

                                                                                                  

MDM:                                                                                              

19:15 Patient medically screened.                                                             kb  

21:19 Data reviewed: vital signs, nurses notes. Data interpreted: Pulse oximetry: on room air kb  

      is 100 %. Interpretation: normal. Counseling: I had a detailed discussion with the          

      patient and/or guardian regarding: the historical points, exam findings, and any            

      diagnostic results supporting the discharge/admit diagnosis, radiology results, the         

      need for outpatient follow up, a family practitioner, to return to the emergency            

      department if symptoms worsen or persist or if there are any questions or concerns that     

      arise at home.                                                                              

                                                                                                  

                                                                                             

19:21 Order name: Chest Single View XRAY                                                      kb  

                                                                                                  

Administered Medications:                                                                         

19:31 Drug: Albuterol 2.5 mg Route: Inhalation;                                               jb4 

20:30 Follow up: Response: Wheezing diminished                                                jb4 

19:31 Drug: AtroVENT Aerosol 0.5 mg Route: Inhalation;                                        jb4 

20:56 Follow up: Response: No adverse reaction; Wheezing diminished                           jb4 

                                                                                                  

                                                                                                  

Disposition:                                                                                      

                                                                                             

02:02 Co-signature as Attending Physician, Rebel Armando MD I agree with the assessment and 4 

      plan of care.                                                                               

                                                                                                  

Disposition:                                                                                      

20 20:43 Discharged to Home. Impression: Dyspnea.                                           

- Condition is Stable.                                                                            

- Discharge Instructions: Shortness of Breath, Easy-to-Read.                                      

- Prescriptions for Albuterol Sulfate 90 mcg/actuation - inhale 1-2 puff by INHALATION            

  route every 4-6 hours; 1 Inhaler.                                                               

- Medication Reconciliation Form, Thank You Letter, Antibiotic Education, Prescription            

  Opioid Use form.                                                                                

- Follow up: Emergency Department; When: As needed; Reason: Worsening of condition.               

  Follow up: Private Physician; When: 2 - 3 days; Reason: Recheck today's complaints,             

  Continuance of care, Re-evaluation by your physician.                                           

                                                                                                  

                                                                                                  

                                                                                                  

Signatures:                                                                                       

Dispatcher [employer]                           EDMS                                                 

Courtney Hammonds, KISHOR-C                 MEGANP-Faustino Ramirez, RN                       RN   jb4                                                  

Rebel Armando MD MD   tw4                                                  

Alvaro Casiano RN                       RN   ll1                                                  

                                                                                                  

Corrections: (The following items were deleted from the chart)                                    

                                                                                             

20:57 20:43 2020 20:43 Discharged to Home. Impression: Dyspnea. Condition is Stable.    jb4 

      Forms are Medication Reconciliation Form, Thank You Letter, Antibiotic Education,           

      Prescription Opioid Use. Follow up: Emergency Department; When: As needed; Reason:          

      Worsening of condition. Follow up: Private Physician; When: 2 - 3 days; Reason: Recheck     

      today's complaints, Continuance of care, Re-evaluation by your physician. kb                

                                                                                                  

**************************************************************************************************

## 2020-11-26 VITALS — SYSTOLIC BLOOD PRESSURE: 115 MMHG | DIASTOLIC BLOOD PRESSURE: 89 MMHG

## 2020-11-26 VITALS — OXYGEN SATURATION: 100 % | TEMPERATURE: 98.3 F

## 2020-12-16 ENCOUNTER — HOSPITAL ENCOUNTER (EMERGENCY)
Dept: HOSPITAL 97 - ER | Age: 25
Discharge: HOME | End: 2020-12-16
Payer: SELF-PAY

## 2020-12-16 DIAGNOSIS — J45.901: Primary | ICD-10-CM

## 2020-12-16 PROCEDURE — 71045 X-RAY EXAM CHEST 1 VIEW: CPT

## 2020-12-16 PROCEDURE — 99284 EMERGENCY DEPT VISIT MOD MDM: CPT

## 2020-12-16 NOTE — RAD REPORT
EXAM DESCRIPTION:  Gregoria Single View12/16/2020 7:01 pm

 

CLINICAL HISTORY:  cough

 

COMPARISON:  November 2020

 

FINDINGS:   The lungs appear clear of acute infiltrate. The heart is normal size

 

IMPRESSION:   No acute abnormalities displayed

## 2020-12-16 NOTE — EDPHYS
Physician Documentation                                                                           

 [facility]                                                                 

Name: Miles Verma                                                                             

Age: 25 yrs                                                                                       

Sex: Male                                                                                         

: 1995                                                                                   

MRN: [id number]                                                                                   

Arrival Date: 2020                                                                          

Time: 18:24                                                                                       

Account#: [id number]                                                                            

Bed 4                                                                                             

Private MD:                                                                                       

ED Physician Dioni Aguilera                                                                      

HPI:                                                                                              

                                                                                             

19:59 This 25 yrs old Black Male presents to ER via EMS with complaints of Asthma             kb  

      Exacerbation.                                                                               

19:59 The patient presents to the emergency department with wheezing, Current therapy:        kb  

      albuterol inhaler. Onset: The symptoms/episode began/occurred 30 minute(s) ago.             

      Modifying factors: The symptoms are alleviated by nothing, the symptoms are aggravated      

      by nothing. Associated signs and symptoms: The patient has no apparent associated signs     

      or symptoms. Severity of symptoms: At their worst the symptoms were mild moderate in        

      the emergency department the symptoms are unchanged. The patient has experienced            

      similar episodes in the past. The patient has not recently seen a physician. shortness      

      of breath started 30 min pta. Pt has been out of his inhaler for a few days.                

                                                                                                  

Historical:                                                                                       

- Allergies:                                                                                      

18:33 NKA;                                                                                    tw2 

- Home Meds:                                                                                      

18:33 Albuterol Inhl [Active];                                                                tw2 

- PMHx:                                                                                           

18:33 Asthma;                                                                                 tw2 

- PSHx:                                                                                           

18:33 None;                                                                                   tw2 

                                                                                                  

- Immunization history:: Adult Immunizations.                                                     

- Social history:: Smoking status: .                                                              

                                                                                                  

                                                                                                  

ROS:                                                                                              

19:57 Constitutional: Negative for fever, chills, and weight loss, Cardiovascular: Negative   kb  

      for chest pain, palpitations, and edema, Abdomen/GI: Negative for abdominal pain,           

      nausea, vomiting, diarrhea, and constipation, MS/Extremity: Negative for injury and         

      deformity, Skin: Negative for injury, rash, and discoloration, Neuro: Negative for          

      headache, weakness, numbness, tingling, and seizure.                                        

19:57 Respiratory: Positive for cough, shortness of breath, Negative for dyspnea on exertion,     

      hemoptysis, orthopnea, pleurisy, sputum production, wheezing.                               

                                                                                                  

Exam:                                                                                             

19:57 Constitutional:  This is a well developed, well nourished patient who is awake, alert,  kb  

      and in no acute distress. Head/Face:  Normocephalic, atraumatic. Chest/axilla:  Normal      

      chest wall appearance and motion.  Nontender with no deformity.  No lesions are             

      appreciated. Cardiovascular:  Regular rate and rhythm with a normal S1 and S2.  No          

      gallops, murmurs, or rubs.  Normal PMI, no JVD.  No pulse deficits. Abdomen/GI:  Soft,      

      non-tender, with normal bowel sounds.  No distension or tympany.  No guarding or            

      rebound.  No evidence of tenderness throughout. Skin:  Warm, dry with normal turgor.        

      Normal color with no rashes, no lesions, and no evidence of cellulitis. MS/ Extremity:      

      Pulses equal, no cyanosis.  Neurovascular intact.  Full, normal range of motion. Neuro:     

       Awake and alert, GCS 15, oriented to person, place, time, and situation.  Cranial          

      nerves II-XII grossly intact.  Motor strength 5/5 in all extremities.  Sensory grossly      

      intact.  Cerebellar exam normal.  Normal gait.                                              

19:57 Respiratory: the patient does not display signs of respiratory distress,  Respirations:     

      normal, Breath sounds: wheezing: inspiratory that is mild, is heard in the  left            

      posterior lower lobe.                                                                       

                                                                                                  

Vital Signs:                                                                                      

18:24 Pulse 98; Resp 18; Temp 98.3(O); Pulse Ox 98% on R/A; Weight 74.84 kg (R); Height 5 ft. tw2 

      9 in. (175.26 cm);                                                                          

18:30  / 80;                                                                            tw2 

19:46  / 67; Pulse 100; Resp 18; Temp 98; Pulse Ox 98% ;                                rv  

18:24 Body Mass Index 24.37 (74.84 kg, 175.26 cm)                                             tw2 

                                                                                                  

MDM:                                                                                              

18:27 Patient medically screened.                                                             kb  

19:58 Data reviewed: vital signs, nurses notes. Data interpreted: Pulse oximetry: on room air kb  

      is 98 %. Interpretation: normal. Counseling: I had a detailed discussion with the           

      patient and/or guardian regarding: the historical points, exam findings, and any            

      diagnostic results supporting the discharge/admit diagnosis, radiology results, the         

      need for outpatient follow up, a family practitioner, to return to the emergency            

      department if symptoms worsen or persist or if there are any questions or concerns that     

      arise at home.                                                                              

19:59 ED course: Pt feeling better after treatment and reports he is ready to go.             kb  

                                                                                                  

                                                                                             

18:35 Order name: Chest Single View XRAY; Complete Time: 19:24                                kb  

                                                                                                  

Administered Medications:                                                                         

18:43 Drug: DuoNeb (3:1) (2.5 mg - 0.5 mg) 3 ml Route: Nebulizer;                             tw2 

19:46 Follow up: Response: No adverse reaction; Marked relief of symptoms                     rv  

                                                                                                  

                                                                                                  

Disposition:                                                                                      

                                                                                             

10:06 Co-signature as Attending Physician, Dioni Aguilera MD I agree with the assessment and  ervin 

      plan of care.                                                                               

                                                                                                  

Disposition:                                                                                      

20 19:48 Discharged to Home. Impression: Unspecified asthma with (acute) exacerbation.      

- Condition is Stable.                                                                            

- Discharge Instructions: Asthma, Adult, Easy-to-Read.                                            

- Prescriptions for Albuterol Sulfate 90 mcg/actuation - inhale 1-2 puff by INHALATION            

  route every 4-6 hours; 1 Inhaler.                                                               

- Medication Reconciliation Form, Thank You Letter, Antibiotic Education, Prescription            

  Opioid Use form.                                                                                

- Follow up: Emergency Department; When: As needed; Reason: Worsening of condition.               

  Follow up: Private Physician; When: 2 - 3 days; Reason: Recheck today's complaints,             

  Continuance of care, Re-evaluation by your physician.                                           

                                                                                                  

                                                                                                  

                                                                                                  

Signatures:                                                                                       

Dispatcher [employer]                           EDCourtney Mckenzie, FNP-C                 MEGANP-Dioni Dallas MD MD cha Wise, Tara, RN                          RN   tw2                                                  

Tony Weller, RN                    RN   rv                                                   

                                                                                                  

Corrections: (The following items were deleted from the chart)                                    

                                                                                             

19:55 19:48 2020 19:48 Discharged to Home. Impression: Unspecified asthma with (acute)  rv  

      exacerbation. Condition is Stable. Forms are Medication Reconciliation Form, Thank You      

      Letter, Antibiotic Education, Prescription Opioid Use. Follow up: Emergency Department;     

      When: As needed; Reason: Worsening of condition. Follow up: Private Physician; When: 2      

      - 3 days; Reason: Recheck today's complaints, Continuance of care, Re-evaluation by         

      your physician. kb                                                                          

                                                                                                  

**************************************************************************************************

## 2020-12-16 NOTE — ER
Nurse's Notes                                                                                     

 [facility]                                                                 

Name: Miles Verma                                                                             

Age: 25 yrs                                                                                       

Sex: Male                                                                                         

: 1995                                                                                   

MRN: [id number]                                                                                   

Arrival Date: 2020                                                                          

Time: 18:24                                                                                       

Account#: [id number]                                                                            

Bed 4                                                                                             

Private MD:                                                                                       

Diagnosis: Unspecified asthma with (acute) exacerbation                                           

                                                                                                  

Presentation:                                                                                     

                                                                                             

18:24 Chief complaint: EMS states: pt was walking outside and called us for an asthma         tw2 

      exacerbation, states he has a hx of asthma and has been without inhaler for 1 week and      

      just became sob passed 30 minutes, refused A\T\A tx on scene, wanted to be brought to ER,   

      vs stable. Coronavirus screen: shortness of breath, Client presents with at least one       

      sign or symptom that may indicate coronavirus-19. Standard/surgical mask placed on the      

      client. Ebola Screen: Patient denies travel to an Ebola-affected area in the 21 days        

      before illness onset.                                                                       

18:24 Method Of Arrival: EMS: [facility]                                                       tw2 

18:30 Initial Sepsis Screen: Does the patient meet any 2 criteria? HR > 90 bpm. No. Patient's tw2 

      initial sepsis screen is negative. Does the patient have a suspected source of              

      infection? No. Patient's initial sepsis screen is negative. Risk Assessment: Do you         

      want to hurt yourself or someone else? Patient reports no desire to harm self or            

      others. Onset of symptoms was 2020.                                            

18:30 Acuity: JANEA 3                                                                           tw2 

                                                                                                  

Triage Assessment:                                                                                

18:25 General: Appears in no apparent distress. Behavior is calm, cooperative, appropriate    tw2 

      for age. Pain: Denies pain. EENT: No signs and/or symptoms were reported regarding the      

      EENT system. Neuro: Level of Consciousness is awake, alert, obeys commands, Oriented to     

      person, place, time, situation. Cardiovascular: Heart tones S1 S2 Patient's skin is         

      warm and dry. Respiratory: Airway is patent Respiratory effort is even, unlabored,          

      Respiratory pattern is regular, symmetrical, Breath sounds with wheezes bilaterally.        

      GI: No signs and/or symptoms were reported involving the gastrointestinal system.           

      Abdomen is flat, Bowel sounds present X 4 quads. : No signs and/or symptoms were          

      reported regarding the genitourinary system. Derm: No signs and/or symptoms reported        

      regarding the dermatologic system. Musculoskeletal: Range of motion: intact in all          

      extremities.                                                                                

                                                                                                  

Historical:                                                                                       

- Allergies:                                                                                      

18:33 NKA;                                                                                    tw2 

- Home Meds:                                                                                      

18:33 Albuterol Inhl [Active];                                                                tw2 

- PMHx:                                                                                           

18:33 Asthma;                                                                                 tw2 

- PSHx:                                                                                           

18:33 None;                                                                                   tw2 

                                                                                                  

- Immunization history:: Adult Immunizations.                                                     

- Social history:: Smoking status: .                                                              

                                                                                                  

                                                                                                  

Screenin:36 Abuse screen: Denies threats or abuse. Nutritional screening: No deficits noted.        tw2 

      Tuberculosis screening: No symptoms or risk factors identified. Fall Risk None              

      identified.                                                                                 

                                                                                                  

Assessment:                                                                                       

18:28 Reassessment: see triage assessment.                                                    tw2 

18:30 Reassessment: unable to get BP at this time, pt states "i got up to plug in my phone".  tw2 

18:32 Reassessment: provider at bedside at this time.                                         tw2 

18:42 Reassessment: pt refusing to take hoodie off, states "well when are they coming cause   tw2 

      im cold", pt offered extra warm blankets and gown, pt refused, pt educated as to poc,       

      pt still refusing at this time.                                                             

                                                                                                  

Vital Signs:                                                                                      

18:24 Pulse 98; Resp 18; Temp 98.3(O); Pulse Ox 98% on R/A; Weight 74.84 kg (R); Height 5 ft. tw2 

      9 in. (175.26 cm);                                                                          

18:30  / 80;                                                                            tw2 

19:46  / 67; Pulse 100; Resp 18; Temp 98; Pulse Ox 98% ;                                rv  

18:24 Body Mass Index 24.37 (74.84 kg, 175.26 cm)                                             tw2 

                                                                                                  

ED Course:                                                                                        

18:24 Patient arrived in ED.                                                                  tw2 

18:24 Bed in low position. Pulse ox on. NIBP on.                                              tw2 

18:26 Courtney Hammonds FNP-C is PHCP.                                                        kb  

18:26 Dioni Aguilera MD is Attending Physician.                                             kb  

18:32 Triage completed.                                                                       tw2 

18:33 Arm band placed on.                                                                     tw2 

18:36 Kalyani Durand, RN is Primary Nurse.                                                        tw2 

18:58 Chest Single View XRAY In Process Unspecified.                                          EDMS

19:13 Primary Nurse role handed off by Kalyani Durand, RN                                         mw2 

19:14 Tony Weller, RN is Primary Nurse.                                                  rv  

19:46 No provider procedures requiring assistance completed. Patient did not have IV access   rv  

      during this emergency room visit.                                                           

                                                                                                  

Administered Medications:                                                                         

18:43 Drug: DuoNeb (3:1) (2.5 mg - 0.5 mg) 3 ml Route: Nebulizer;                             tw2 

19:46 Follow up: Response: No adverse reaction; Marked relief of symptoms                     rv  

                                                                                                  

                                                                                                  

Outcome:                                                                                          

19:48 Discharge ordered by MD.                                                                carlyn  

19:55 Discharged to home ambulatory.                                                          rv  

19:55 Condition: improved                                                                         

19:55 Discharge instructions given to patient, Instructed on discharge instructions, follow       

      up and referral plans. medication usage, Demonstrated understanding of instructions,        

      follow-up care, medications, Prescriptions given X 1.                                       

19:55 Patient left the ED.                                                                    rv  

                                                                                                  

Signatures:                                                                                       

Dispatcher [employer]                           EDMS                                                 

Courtney Hammonds, MEGANP-C                 MEGANP-Kalyani Loja, MARCE                          RN   2                                                  

Juan Jose Lewis                            2                                                  

Tony Weller RN                    RN   rv                                                   

                                                                                                  

**************************************************************************************************

## 2020-12-18 VITALS — SYSTOLIC BLOOD PRESSURE: 134 MMHG | TEMPERATURE: 98 F | DIASTOLIC BLOOD PRESSURE: 67 MMHG

## 2020-12-18 VITALS — OXYGEN SATURATION: 98 %

## 2020-12-20 ENCOUNTER — HOSPITAL ENCOUNTER (EMERGENCY)
Age: 25
Discharge: HOME | End: 2020-12-20
Payer: SELF-PAY

## 2020-12-20 ENCOUNTER — HOSPITAL ENCOUNTER (EMERGENCY)
Dept: HOSPITAL 97 - ER | Age: 25
Discharge: HOME | End: 2020-12-20
Payer: SELF-PAY

## 2020-12-20 DIAGNOSIS — Z23: ICD-10-CM

## 2020-12-20 DIAGNOSIS — F17.210: ICD-10-CM
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
## 2020-12-20 DIAGNOSIS — J45.909: ICD-10-CM
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
## 2020-12-20 DIAGNOSIS — S60.221A: Primary | ICD-10-CM

## 2020-12-20 DIAGNOSIS — S61.411A: ICD-10-CM

## 2020-12-20 PROCEDURE — 99283 EMERGENCY DEPT VISIT LOW MDM: CPT

## 2020-12-20 PROCEDURE — 90471 IMMUNIZATION ADMIN: CPT

## 2020-12-20 PROCEDURE — 90714 TD VACC NO PRESV 7 YRS+ IM: CPT

## 2020-12-20 PROCEDURE — 99284 EMERGENCY DEPT VISIT MOD MDM: CPT

## 2020-12-20 NOTE — ER
Nurse's Notes                                                                                     

 [facility]                                                                 

Name: Miles Verma                                                                             

Age: 25 yrs                                                                                       

Sex: Male                                                                                         

: 1995                                                                                   

MRN: [id number]                                                                                   

Arrival Date: 2020                                                                          

Time: 16:03                                                                                       

Account#: [id number]                                                                            

Bed 14                                                                                            

Private MD:                                                                                       

Diagnosis: Contusion of right hand;Laceration of Right Hand                                       

                                                                                                  

Presentation:                                                                                     

                                                                                             

15:58 Chief complaint: EMS states: Laceration to left middle finger and left ring finger by   vg1 

      punching the cell door.                                                                     

15:58 Method Of Arrival: EMS: [facility]                                                vg1 

15:58 Coronavirus screen: Client denies travel out of the [place] in the last 14 days. Ebola     vg1 

      Screen: Patient negative for fever greater than or equal to 101.5 degrees Fahrenheit,       

      and additional compatible Ebola Virus Disease symptoms. Complicating Factors: There are     

      no complicating factors for this patient. Initial Sepsis Screen: Does the patient meet      

      any 2 criteria? No. Patient's initial sepsis screen is negative. Does the patient have      

      a suspected source of infection? No. Patient's initial sepsis screen is negative. Risk      

      Assessment: Do you want to hurt yourself or someone else? Patient reports no desire to      

      harm self or others. Onset of symptoms was 2020.                               

15:58 Acuity: JANAE 4                                                                           vg1 

                                                                                                  

Historical:                                                                                       

- Allergies:                                                                                      

16:08 NKA;                                                                                    vg1 

- Home Meds:                                                                                      

16:08 Albuterol Inhl [Active];                                                                vg1 

- PMHx:                                                                                           

16:08 Asthma;                                                                                 vg1 

                                                                                                  

- Immunization history:: Adult Immunizations up to date, Flu vaccine is not up to date.           

  It has been more than one year since last vaccine.                                              

- Social history:: Smoking status: Patient reports the use of cigarette tobacco                   

  products, denies chronic smoking, but will smoke occasionally.                                  

                                                                                                  

                                                                                                  

Screenin:10 Abuse screen: Denies threats or abuse. Nutritional screening: No deficits noted.        vg1 

      Tuberculosis screening: No symptoms or risk factors identified. Fall Risk No fall in        

      past 12 months (0 pts). No secondary diagnosis (0 pts). No IV (0 pts). Ambulatory Aid-      

      None/Bed Rest/Nurse Assist (0 pts). Gait- Normal/Bed Rest/Wheelchair (0 pts) Mental         

      Status- Oriented to own ability (0 pts). Total Gallego Fall Scale indicates No Risk (0-24     

      pts).                                                                                       

                                                                                                  

Assessment:                                                                                       

16:09 General: Appears in no apparent distress. comfortable, Behavior is calm, cooperative.   vg1 

      Pain: Complains of pain in left hand Pain currently is 8 out of 10 on a pain scale.         

      Quality of pain is described as numb, Pain began 1 hour ago. Neuro: Level of                

      Consciousness is awake, alert, obeys commands, Oriented to person, place, time,             

      situation. Cardiovascular: Patient's skin is warm and dry. Respiratory: Airway is           

      patent Respiratory effort is even, unlabored, Respiratory pattern is regular,               

      symmetrical. GI: No signs and/or symptoms were reported involving the gastrointestinal      

      system. : No signs and/or symptoms were reported regarding the genitourinary system.      

      EENT: No signs and/or symptoms were reported regarding the EENT system. Derm: Skin is       

      pink, warm \T\ dry. Musculoskeletal: Range of motion: limited in left middle finger and     

      ring finger. Injury Description: Laceration is clean, not bleeding.                         

17:00 Reassessment: Patient appears in no apparent distress at this time. No changes from     vg1 

      previously documented assessment. Patient is alert, oriented x 3, equal unlabored           

      respirations, skin warm/dry/pink.                                                           

                                                                                                  

Vital Signs:                                                                                      

15:58  / 87; Pulse 81; Resp 16; Temp 99.4(TE); Pulse Ox 98% on R/A; Weight 77.11 kg;    vg1 

      Height 5 ft. 9 in. (175.26 cm); Pain 8/10;                                                  

16:30  / 73; Pulse 61; Resp 14; Pulse Ox 98% on R/A;                                    vg1 

17:00  / 83; Pulse 60; Resp 14; Pulse Ox 98% on R/A;                                    vg1 

17:30  / 83; Pulse 66; Resp 14; Pulse Ox 97% on R/A;                                    vg1 

15:58 Body Mass Index 25.10 (77.11 kg, 175.26 cm)                                             vg1 

                                                                                                  

ED Course:                                                                                        

16:03 Patient arrived in ED.                                                                  1 

16:04 Shin Guerrier PA is PHCP.                                                              [facility] 

16:04 Juan A Carrera MD is Attending Physician.                                                [facility] 

16:08 Triage completed.                                                                       vg1 

16:10 Arm band placed on.                                                                     vg1 

16:10 Patient has correct armband on for positive identification. Bed in low position. Call   [facility] 

      light in reach. Side rails up X 1. LJ  at bedside.                            

16:16 Brittany Sabillon, RN is Primary Nurse.                                                  vg1 

16:28 Xray at bedside.                                                                        vg1 

16:42 Hand Right 3 View XRAY In Process Unspecified.                                          EDMS

17:58 Dressings: non-adherent dressing x 1 left hand.                                         vg1 

17:59 No provider procedures requiring assistance completed. Patient did not have IV access   vg1 

      during this emergency room visit.                                                           

                                                                                                  

Administered Medications:                                                                         

16:41 Drug: Tetanus-Diphtheria Toxoid Adult 0.5 ml {: [employer]. Exp:         vg1 

      2022. Lot #: [id number]. } Route: IM; Site: right deltoid;                                 

18:00 Follow up: Response: No adverse reaction                                                vg1 

                                                                                                  

                                                                                                  

Outcome:                                                                                          

17:30 Discharge ordered by MD.                                                                lars 

17:59 Discharged to Law Enforcement                                                           vg1 

17:59 Condition: stable                                                                           

17:59 Discharge instructions given to patient, police, Instructed on discharge instructions,      

      follow up and referral plans. Demonstrated understanding of instructions, follow-up         

      care.                                                                                       

18:00 Patient left the ED.                                                                    vg1 

                                                                                                  

Signatures:                                                                                       

Dispatcher [employer]                           EDMS                                                 

Shin Guerrier PA                       PA   Brittany Sales, RN                    RN   vg1                                                  

                                                                                                  

**************************************************************************************************

## 2020-12-20 NOTE — EDPHYS
Physician Documentation                                                                           

 [facility]                                                                 

Name: Miles Verma                                                                             

Age: 25 yrs                                                                                       

Sex: Male                                                                                         

: 1995                                                                                   

MRN: [id number]                                                                                   

Arrival Date: 2020                                                                          

Time: 13:48                                                                                       

Account#: [id number]                                                                            

Bed 13                                                                                            

Private MD:                                                                                       

ED Physician Juan A Carrera                                                                         

HPI:                                                                                              

                                                                                             

13:52 This 25 yrs old Black Male presents to ER via EMS with complaints of Hallucinations.    rn  

13:52 Reports smoked Cesario about 1 hour PTA, since then has been seeing dots and having        rn  

      trouble concentrating, denies other drug use or ETOH. No daily medication or medication     

      change. No fever/neck pain or stiffness/vomiting/diarrhea/chest pain/cough/sob.. Onset:     

      The symptoms/episode began/occurred 1 hour(s) ago. Severity of symptoms: At their worst     

      the symptoms were mild in the emergency department the symptoms are unchanged. The          

      patient has not experienced similar symptoms in the past. The patient has not recently      

      seen a physician.                                                                           

                                                                                                  

Historical:                                                                                       

- Allergies:                                                                                      

13:50 NKA;                                                                                    sv  

- PMHx:                                                                                           

13:50 Asthma;                                                                                 sv  

- PSHx:                                                                                           

13:50 None;                                                                                   sv  

                                                                                                  

- Family history:: not pertinent.                                                                 

- Hospitalizations: : No recent hospitalization is reported.                                      

                                                                                                  

                                                                                                  

ROS:                                                                                              

13:52 Constitutional: Negative for fever, chills, and weight loss, Eyes: Negative for injury, rn  

      pain, redness, and discharge, Neck: Negative for injury, pain, and swelling,                

      Cardiovascular: Negative for chest pain, palpitations, and edema, Respiratory: Negative     

      for shortness of breath, cough, wheezing, and pleuritic chest pain, Abdomen/GI:             

      Negative for abdominal pain, nausea, vomiting, diarrhea, and constipation, Back:            

      Negative for injury and pain, MS/Extremity: Negative for injury and deformity, Skin:        

      Negative for injury, rash, and discoloration, Neuro: Negative for headache, weakness,       

      numbness, tingling, and seizure.                                                            

                                                                                                  

Exam:                                                                                             

13:52 Constitutional:  This is a well developed, well nourished patient who is awake, alert,  rn  

      malodorous  Head/Face:  Normocephalic, atraumatic. ENT:  dry MM Neck:  Trachea midline,     

      no masses palpated.  Supple, full range of motion without nuchal rigidity, or vertebral     

      point tenderness.  No Meningismus. Cardiovascular:  Regular rate and rhythm.  No pulse      

      deficits. Respiratory:  No increased work of breathing, no retractions or nasal             

      flaring. Abdomen/GI:  soft, non-tender MS/ Extremity:  Pulses equal, no cyanosis.           

      Neuro:  Awake and alert, GCS 15, oriented to person, place, time, and situation.            

      Cranial nerves II-XII grossly intact.  Motor strength 5/5 in all extremities.  Sensory      

      grossly intact.                                                                             

                                                                                                  

Vital Signs:                                                                                      

13:50  / 89; Pulse 87; Resp 16; Temp 98.6; Pulse Ox 99% ; Weight 74 kg; Height 5 ft. 9  sv  

      in. (175.26 cm);                                                                            

13:50 Body Mass Index 24.09 (74.00 kg, 175.26 cm)                                             sv  

                                                                                                  

MDM:                                                                                              

13:49 Patient medically screened.                                                             rn  

14:07 Differential Diagnosis adverse reaction to synthetic marijuana. Data reviewed: vital    rn  

      signs, nurses notes, and as a result, I will discharge patient. Counseling: I had a         

      detailed discussion with the patient and/or guardian regarding: the historical points,      

      exam findings, and any diagnostic results supporting the discharge/admit diagnosis, the     

      need for outpatient follow up, to return to the emergency department if symptoms worsen     

      or persist or if there are any questions or concerns that arise at home. ED course: Pt      

      became combative and arguing with police, told them he doesn't emergently need IVF if       

      can drink water, otherwise is result of drug use, will medically clear for [facility]. .          

                                                                                                  

Administered Medications:                                                                         

14:12 Not Given (Physician Discretion): NS 0.9% 1000 ml IV at 1000 ml once                    sv  

                                                                                                  

                                                                                                  

Disposition:                                                                                      

20 14:08 Discharged to Home. Impression: Adverse effect of synthetic marijuana.             

- Condition is Stable.                                                                            

- Discharge Instructions: What You Need To Know About Illegal Drug Use and Dependence,            

  Youth.                                                                                          

                                                                                                  

- Medication Reconciliation Form, Thank You Letter, Antibiotic Education, Prescription            

  Opioid Use form.                                                                                

- Follow up: Private Physician; When: As needed; Reason: Recheck today's complaints,              

  Re-evaluation by your physician.                                                                

- Problem is new.                                                                                 

- Symptoms have improved.                                                                         

                                                                                                  

                                                                                                  

                                                                                                  

Signatures:                                                                                       

Kami Walter RN RN   sv                                                   

Juan A Carrera MD MD   rn                                                   

                                                                                                  

Corrections: (The following items were deleted from the chart)                                    

14:13 13:50 IV Saline Lock ordered. rn                                                        mita  

14:14 14:08 2020 14:08 Discharged to Home. Impression: Adverse effect of synthetic      sv  

      marijuana. Condition is Stable. Forms are Medication Reconciliation Form, Thank You         

      Letter, Antibiotic Education, Prescription Opioid Use. Follow up: Private Physician;        

      When: As needed; Reason: Recheck today's complaints, Re-evaluation by your physician.       

      Problem is new. Symptoms have improved. rn                                                  

                                                                                                  

**************************************************************************************************

## 2020-12-20 NOTE — EDPHYS
Physician Documentation                                                                           

 [facility]                                                                 

Name: Miles Verma                                                                             

Age: 25 yrs                                                                                       

Sex: Male                                                                                         

: 1995                                                                                   

MRN: [id number]                                                                                   

Arrival Date: 2020                                                                          

Time: 16:03                                                                                       

Account#: [id number]                                                                            

Bed 14                                                                                            

Private MD:                                                                                       

ED Physician Juan A Carrera                                                                         

Historical:                                                                                       

- Allergies:                                                                                      

                                                                                             

16:08 NKA;                                                                                    vg1 

- Home Meds:                                                                                      

16:08 Albuterol Inhl [Active];                                                                vg1 

- PMHx:                                                                                           

16:08 Asthma;                                                                                 vg1 

                                                                                                  

- Immunization history:: Adult Immunizations up to date, Flu vaccine is not up to date.           

  It has been more than one year since last vaccine.                                              

- Social history:: Smoking status: Patient reports the use of cigarette tobacco                   

  products, denies chronic smoking, but will smoke occasionally.                                  

                                                                                                  

                                                                                                  

Vital Signs:                                                                                      

15:58  / 87; Pulse 81; Resp 16; Temp 99.4(TE); Pulse Ox 98% on R/A; Weight 77.11 kg;    vg1 

      Height 5 ft. 9 in. (175.26 cm); Pain 8/10;                                                  

16:30  / 73; Pulse 61; Resp 14; Pulse Ox 98% on R/A;                                    vg1 

17:00  / 83; Pulse 60; Resp 14; Pulse Ox 98% on R/A;                                    vg1 

17:30  / 83; Pulse 66; Resp 14; Pulse Ox 97% on R/A;                                    vg1 

15:58 Body Mass Index 25.10 (77.11 kg, 175.26 cm)                                             vg1 

                                                                                                  

MDM:                                                                                              

16:15 Patient medically screened.                                                             [facility] 

17:30 Data reviewed: vital signs, nurses notes. Counseling: I had a detailed discussion with  lars 

      the patient and/or guardian regarding: the historical points, exam findings, and any        

      diagnostic results supporting the discharge/admit diagnosis, radiology results, the         

      need for outpatient follow up, to return to the emergency department if symptoms worsen     

      or persist or if there are any questions or concerns that arise at home.                    

                                                                                                  

                                                                                             

16:18 Order name: Hand Right 3 View XRAY; Complete Time: 17:29                                lars 

                                                                                                  

Administered Medications:                                                                         

16:41 Drug: Tetanus-Diphtheria Toxoid Adult 0.5 ml {: [employer]. Exp:         vg1 

      2022. Lot #: [id number]. } Route: IM; Site: right deltoid;                                 

18:00 Follow up: Response: No adverse reaction                                                vg1 

                                                                                                  

                                                                                                  

Disposition:                                                                                      

18:33 Co-signature as Attending Physician, Juan A Carrera MD.                                    rn  

                                                                                                  

Disposition:                                                                                      

20 17:30 Discharged to Home. Impression: Contusion of right hand, Laceration of Right       

  Hand.                                                                                           

- Condition is Stable.                                                                            

- Discharge Instructions: Hand Contusion, Nonsutured Laceration Care.                             

                                                                                                  

- Medication Reconciliation Form, Thank You Letter, Antibiotic Education, Prescription            

  Opioid Use form.                                                                                

- Follow up: Private Physician; When: 2 - 3 days; Reason: Recheck today's complaints,             

  Continuance of care, Re-evaluation by your physician.                                           

                                                                                                  

                                                                                                  

                                                                                                  

Addendum:                                                                                         

2021                                                                                        

     09:35 Addendum: This is a 25 year old male with a history of asthma that presents to the ED   j


           with complaints of injury to the right hand after punching a cell door. Denies other   

           injury. ROS Positive for hand pain, skin laceration, otherwise negative. PE HEENT:     

           Atraumatic, GEN: NAD, Cardio: RRR, RESP: Nonlabord, MS: FROM appreciated to the right  

           hand, < 2 sec dist cap refill, compartments are soft, NVI, PSYCH: Calm, pleasant,      

           Neuro: A x O x 3.                                                                      

                                                                                                  

Signatures:                                                                                       

Dispatcher [employer]                           EDMS                                                 

Shin Guerrier PA PA jmm Nieto, Roman, MD MD rn Garcia, Victoria, RN                    RN   vg1                                                  

                                                                                                  

Corrections: (The following items were deleted from the chart)                                    

                                                                                             

18:00 17:30 2020 17:30 Discharged to Home. Impression: Contusion of right hand;         vg1 

      Laceration of Right Hand. Condition is Stable. Forms are Medication Reconciliation          

      Form, Thank You Letter, Antibiotic Education, Prescription Opioid Use. Follow up:           

      Private Physician; When: 2 - 3 days; Reason: Recheck today's complaints, Continuance of     

      care, Re-evaluation by your physician. lars                                                  

                                                                                                  

**************************************************************************************************

## 2020-12-20 NOTE — RAD REPORT
EXAM DESCRIPTION:  RAD - Hand Right 3 View - 12/20/2020 4:38 pm

 

CLINICAL HISTORY:  hand injury

Pain and laceration

 

COMPARISON:  No comparisons

 

FINDINGS:  No fracture or radiopaque foreign body is seen.

## 2020-12-20 NOTE — ER
Nurse's Notes                                                                                     

 [facility]                                                                 

Name: Miles Verma                                                                             

Age: 25 yrs                                                                                       

Sex: Male                                                                                         

: 1995                                                                                   

MRN: [id number]                                                                                   

Arrival Date: 2020                                                                          

Time: 13:48                                                                                       

Account#: [id number]                                                                            

Bed 13                                                                                            

Private MD:                                                                                       

Diagnosis: Adverse effect of synthetic marijuana                                                  

                                                                                                  

Presentation:                                                                                     

                                                                                             

13:48 Chief complaint: EMS states: pt smoke Cesario this morning around 0900 and has been seeing sv  

      "spots" since then. [employer] at bedside. Risk Assessment: Do you want to hurt yourself or       

      someone else? Patient reports no desire to harm self or others. Onset of symptoms was       

      2020.                                                                          

13:48 Method Of Arrival: EMS: [facility]                                                sv  

13:48 Acuity: JANAE 4                                                                           sv  

13:50 Coronavirus screen: Client denies travel out of the [place] in the last 14 days. At this   sv  

      time, the client does not indicate any symptoms associated with coronavirus-19. Ebola       

      Screen: No symptoms or risks identified at this time. Initial Sepsis Screen: Does the       

      patient meet any 2 criteria? No. Patient's initial sepsis screen is negative. Does the      

      patient have a suspected source of infection? No. Patient's initial sepsis screen is        

      negative.                                                                                   

                                                                                                  

Triage Assessment:                                                                                

13:48 General: Appears in no apparent distress. comfortable, well developed, Behavior is      sv  

      cooperative, appropriate for age. Pain: Denies pain. Neuro: Level of Consciousness is       

      awake, alert, obeys commands, Oriented to person, place, time, situation, Gait is           

      steady. Respiratory: Respiratory effort is even, unlabored, Respiratory pattern is          

      regular, symmetrical.                                                                       

                                                                                                  

Historical:                                                                                       

- Allergies:                                                                                      

13:50 NKA;                                                                                    sv  

- PMHx:                                                                                           

13:50 Asthma;                                                                                 sv  

- PSHx:                                                                                           

13:50 None;                                                                                   sv  

                                                                                                  

- Family history:: not pertinent.                                                                 

- Hospitalizations: : No recent hospitalization is reported.                                      

                                                                                                  

                                                                                                  

Screenin:49 Abuse screen: Denies threats or abuse. Nutritional screening: No deficits noted.        tw2 

      Tuberculosis screening: No symptoms or risk factors identified. Fall Risk None              

      identified.                                                                                 

                                                                                                  

Assessment:                                                                                       

13:49 Reassessment: provider at bedside at this time.                                         tw2 

                                                                                                  

Vital Signs:                                                                                      

13:50  / 89; Pulse 87; Resp 16; Temp 98.6; Pulse Ox 99% ; Weight 74 kg; Height 5 ft. 9  sv  

      in. (175.26 cm);                                                                            

13:50 Body Mass Index 24.09 (74.00 kg, 175.26 cm)                                             sv  

                                                                                                  

ED Course:                                                                                        

13:48 Patient arrived in ED.                                                                  vg1 

13:49 Juan A Carrera MD is Attending Physician.                                                rn  

13:49 Triage completed.                                                                       sv  

13:50 Arm band placed on Patient placed in an exam room, on a stretcher.                      sv  

13:50 Patient has correct armband on for positive identification. Placed in gown. Bed in low  sv  

      position. Side rails up X2.                                                                 

13:52 Brittany Sabillon, RN is Primary Nurse.                                                  vg1 

14:13 No provider procedures requiring assistance completed. Patient did not have IV access   sv  

      during this emergency room visit.                                                           

                                                                                                  

Administered Medications:                                                                         

14:12 Not Given (Physician Discretion): NS 0.9% 1000 ml IV at 1000 ml once                    sv  

                                                                                                  

                                                                                                  

Outcome:                                                                                          

14:08 Discharge ordered by MD.                                                                rn  

14:13 Discharged to Law Enforcement                                                           sv  

14:13 Condition: stable                                                                           

14:13 Discharge instructions given to police, Instructed on [employer] left before getting             

      discharge paperwork. Pt left in handcuffs                                                   

14:14 Patient left the ED.                                                                    sv  

                                                                                                  

Signatures:                                                                                       

Kami Walter, RN                    RN   sv                                                   

Juan A Carrera MD MD rn Wise, Tara, RN RN   tw2                                                  

Brittany Sabillon RN                    RN   vg1                                                  

                                                                                                  

**************************************************************************************************

## 2020-12-22 VITALS — SYSTOLIC BLOOD PRESSURE: 116 MMHG | OXYGEN SATURATION: 97 % | DIASTOLIC BLOOD PRESSURE: 83 MMHG

## 2024-10-29 ENCOUNTER — HOSPITAL ENCOUNTER (EMERGENCY)
Dept: HOSPITAL 97 - ER | Age: 29
Discharge: HOME | End: 2024-10-29
Payer: SELF-PAY

## 2024-10-29 VITALS — SYSTOLIC BLOOD PRESSURE: 107 MMHG | DIASTOLIC BLOOD PRESSURE: 70 MMHG | OXYGEN SATURATION: 99 %

## 2024-10-29 VITALS — TEMPERATURE: 98.2 F

## 2024-10-29 DIAGNOSIS — E87.6: ICD-10-CM

## 2024-10-29 DIAGNOSIS — R07.9: Primary | ICD-10-CM

## 2024-10-29 DIAGNOSIS — F17.210: ICD-10-CM

## 2024-10-29 LAB
ALBUMIN SERPL BCP-MCNC: 3.7 G/DL (ref 3.4–5)
ALBUMIN/GLOB SERPL: 1.1 {RATIO} (ref 1.1–1.8)
ALP SERPL-CCNC: 170 U/L (ref 45–117)
ALT SERPL W P-5'-P-CCNC: 51 U/L (ref 16–61)
ANION GAP SERPL CALC-SCNC: 7.8 MEQ/L (ref 5–15)
AST SERPL W P-5'-P-CCNC: 34 U/L (ref 15–37)
BILIRUB INDIRECT SERPL-MCNC: 1 MG/DL (ref 0.2–0.8)
BUN BLD-MCNC: 13 MG/DL (ref 7–18)
GLOBULIN SER CALC-MCNC: 3.3 G/DL (ref 2.3–3.5)
GLUCOSE SERPLBLD-MCNC: 91 MG/DL (ref 74–106)
HCT VFR BLD CALC: 39.7 % (ref 39.6–49)
HGB BLD-MCNC: 13.9 G/DL (ref 13.6–17.9)
LYMPHOCYTES # SPEC AUTO: 1.9 K/UL (ref 0.7–4.9)
MCH RBC QN AUTO: 31 PG (ref 27–35)
MCHC RBC AUTO-ENTMCNC: 35 G/DL (ref 32–36)
MCV RBC: 88.3 FL (ref 80–100)
NRBC # BLD: 0 10*3/UL (ref 0–0)
NRBC BLD AUTO-RTO: 0.1 % (ref 0–0)
PMV BLD: 8.4 FL (ref 7.6–11.3)
POTASSIUM SERPL-SCNC: 2.8 MEQ/L (ref 3.5–5.1)
RBC # BLD: 4.49 M/UL (ref 4.33–5.43)
TROPONIN I SERPL HS-MCNC: 17.5 PG/ML (ref ?–58.9)
WBC # BLD AUTO: 8.6 THOU/UL (ref 4.3–10.9)

## 2024-10-29 PROCEDURE — 93005 ELECTROCARDIOGRAM TRACING: CPT

## 2024-10-29 PROCEDURE — 71045 X-RAY EXAM CHEST 1 VIEW: CPT

## 2024-10-29 PROCEDURE — 36415 COLL VENOUS BLD VENIPUNCTURE: CPT

## 2024-10-29 PROCEDURE — 85025 COMPLETE CBC W/AUTO DIFF WBC: CPT

## 2024-10-29 PROCEDURE — 80076 HEPATIC FUNCTION PANEL: CPT

## 2024-10-29 PROCEDURE — 80048 BASIC METABOLIC PNL TOTAL CA: CPT

## 2024-10-29 PROCEDURE — 84484 ASSAY OF TROPONIN QUANT: CPT

## 2024-10-29 NOTE — ER
Nurse's Notes                                                                                     

 [facility]                                                                 

Name: Miles Verma                                                                             

Age: 28 yrs                                                                                       

Sex: Male                                                                                         

: 1995                                                                                   

MRN: [id number]                                                                                   

Arrival Date: 10/29/2024                                                                          

Time: 16:10                                                                                       

Account#: [id number]                                                                            

Bed 14                                                                                            

Private MD:                                                                                       

Diagnosis: Chest pain, hypokalemia                                                                

                                                                                                  

Presentation:                                                                                     

10/29                                                                                             

16:30 Chief complaint: Patient states: Chest Pain. Coronavirus screen: Client denies travel   ar6 

      out of the [place] in the last 14 days. At this time, the client does not indicate any         

      symptoms associated with coronavirus-19. Ebola Screen: Patient negative for fever           

      greater than or equal to 101.5 degrees Fahrenheit, and additional compatible Ebola          

      Virus Disease symptoms Patient denies exposure to infectious person. Patient denies         

      travel to an Ebola-affected area in the 21 days before illness onset. No symptoms or        

      risks identified at this time. Initial Sepsis Screen: Does the patient meet any 2           

      criteria? No. Patient's initial sepsis screen is negative. Does the patient have a          

      suspected source of infection? No. Patient's initial sepsis screen is negative. Risk        

      Assessment: Do you want to hurt yourself or someone else? Patient reports no desire to      

      harm self or others. Onset of symptoms was 2024 at 16:00.                       

16:30 Method Of Arrival: Law [employer]: [place] [id number] 

16:30 Acuity: JANAE 3                                                                           ar6 

                                                                                                  

Triage Assessment:                                                                                

16:37 General: Appears in no apparent distress. comfortable, Behavior is calm, cooperative.   ar6 

      Pain: Complains of pain in chest Pain currently is 5 out of 10 on a pain scale. EENT:       

      Oral mucosa is moist. Neuro: Level of Consciousness is awake, alert, obeys commands,        

      Oriented to person, place, time, situation. Cardiovascular: Reports chest pain, Denies      

      nausea, shortness of breath, vomiting, Capillary refill < 3 seconds Rhythm is regular.      

      Respiratory: Airway is patent. GI: Abdomen is flat, non-distended. : No signs and/or      

      symptoms were reported regarding the genitourinary system. Derm: Skin is intact, is         

      healthy with good turgor, Skin is dry, Skin is pink, warm \T\ dry. Musculoskeletal: No      

      signs and/or symptoms reported regarding the musculoskeletal system.                        

                                                                                                  

Historical:                                                                                       

- Allergies:                                                                                      

16:37 NKA;                                                                                    ar6 

                                                                                                  

- Immunization history:: Adult Immunizations up to date.                                          

- Infectious Disease History:: Denies.                                                            

- Social history:: Smoking status: Patient reports the use of cigarette tobacco                   

  products, 1.5 packs per day.                                                                    

- Family history:: not pertinent.                                                                 

                                                                                                  

                                                                                                  

Screenin:40 [facility] ED Fall Risk Assessment (Adult) History of falling in the last 3 months,       ar6 

      including since admission No falls in past 3 months (0 pts) Confusion or Disorientation     

      No (0 pts) Intoxicated or Sedated No (0 pts) Impaired Gait No (0 pts) Mobility Assist       

      Device Used No (0 pt) Altered Elimination No (0 pt) Score/Fall Risk Level 0 - 2 = Low       

      Risk Oriented to surroundings, Maintained a safe environment, Educated pt \T\ family on     

      fall prevention, incl call for assistance when getting out of bed, Hourly rounding          

      (assess needs \T\ fall precautionary measures) done. Abuse screen: Denies threats or        

      abuse. Denies injuries from another. Nutritional screening: No deficits noted.              

      Tuberculosis screening: No symptoms or risk factors identified.                             

                                                                                                  

Assessment:                                                                                       

16:40 Reassessment: see triage assessment.                                                    ar6 

17:58 General: Appears in no apparent distress. comfortable, Behavior is calm, cooperative.   ph  

      Pain: Complains of pain in mid-sternal area. Neuro: Level of Consciousness is awake,        

      alert, obeys commands, Oriented to person, place, time, situation. Cardiovascular:          

      Capillary refill < 3 seconds in bilateral fingers Patient's skin is warm and dry.           

      Cardiovascular: Reports chest pain, Denies lightheadedness, nausea, shortness of            

      breath, Rhythm is sinus bradycardia. Respiratory: Airway is patent Respiratory effort       

      is even, unlabored. GI: No signs and/or symptoms were reported involving the                

      gastrointestinal system. Derm: Skin is pink, warm \T\ dry.                                  

18:48 Reassessment: per Dr. Frank ARIAS potassium is verified ordered.                           ar6 

                                                                                                  

Vital Signs:                                                                                      

16:30  / 78; Pulse 62; Resp 18; Temp 98.2; Pulse Ox 100% on R/A; Weight 72.57 kg;       ar6 

      Height 5 ft. 9 in. ; Pain 5/10;                                                             

16:37  / 78; Pulse 62; Resp 18; Pulse Ox 100% ;                                         ar6 

17:58  / 76; Pulse 58; Resp 18; Pulse Ox 98% on R/A;                                    ph  

18:46  / 70; Pulse 61; Resp 18; Pulse Ox 99% on R/A;                                    ar6 

16:30 Body Mass Index 23.63 (72.57 kg, 175.26 cm)                                             ar6 

16:30 Pain Scale: Adult                                                                       ar6 

                                                                                                  

ED Course:                                                                                        

16:18 Patient arrived in ED.                                                                  ph  

16:19 Jonn Tan MD is Attending Physician.                                            rt  

16:37 Triage completed.                                                                       ar6 

16:37 Arm band placed on right wrist.                                                         ar6 

16:40 Patient has correct armband on for positive identification. Bed in low position. Call   ar6 

      light in reach. Side rails up X2. [place] [employer] at bedside. Provided Education on: plan       

      of care. Client placed on continuous cardiac and pulse oximetry monitoring. NIBP            

      monitoring applied. Door closed. Moved to private room. Warm blanket given.                 

16:41 No provider procedures requiring assistance completed.                                  ar6 

17:19 Wilma Barnett, RN is Primary Nurse.                                                    ph  

17:57 Basic Metabolic Panel Sent.                                                             ph  

17:57 LFT's Sent.                                                                             ph  

17:58 XRAY Chest (1 view) In Process Unspecified.                                             EDMS

17:58 Troponin HS Sent.                                                                       ph  

17:58 Initial lab(s) drawn, by me, sent to lab. EKG done, by ED staff, reviewed by Jonn Tan MD. Inserted saline lock: 22 gauge in right antecubital area, using aseptic      

      technique. Blood collected. Flushed with 10 mL NS.                                          

18:07 Attending Physician role handed off by Jonn Tan MD                             sp3 

18:07 Kena Marie MD is Attending Physician.                                                sp3 

18:46 IV discontinued, intact, bleeding controlled, No redness/swelling at site. Pressure     ar6 

      dressing applied.                                                                           

                                                                                                  

Administered Medications:                                                                         

17:57 Drug: Ketorolac IM 15 mg IM once Route: IM; Site: right deltoid;                        ph  

18:47 Follow up: Response: No adverse reaction                                                ar6 

18:46 Drug: Potassium Chloride PO Liquid 40 mEq PO once Route: PO;                            ar6 

18:47 Follow up: Response: No adverse reaction                                                ar6 

                                                                                                  

                                                                                                  

Medication:                                                                                       

17:59 VIS not applicable for this client.                                                     ph  

                                                                                                  

Outcome:                                                                                          

18:32 Discharge ordered by MD.                                                                sp3 

18:46 Discharged to [employer] Enforcement                                                           ar6 

18:46 Condition: good                                                                             

18:46 Discharge instructions given to patient, Instructed on discharge instructions, follow       

      up and referral plans. Demonstrated understanding of instructions, follow-up care,          

19:00 Patient left the ED.                                                                    ph  

                                                                                                  

Signatures:                                                                                       

Dispatcher [employer]                           Wilma Heart RN                      RN   ph                                                   

Kena Marie MD MD   sp3                                                  

Jonn Tan MD MD   rt                                                   

Georgia Estrada RN                      RN   ar6                                                  

                                                                                                  

Corrections: (The following items were deleted from the chart)                                    

16:38 16:37 PMHx: Asthma; ar6                                                                 ar6 

                                                                                                  

**************************************************************************************************

## 2024-10-29 NOTE — EDPHYS
Physician Documentation                                                                           

 [facility]                                                                 

Name: Miles Verma                                                                             

Age: 28 yrs                                                                                       

Sex: Male                                                                                         

: 1995                                                                                   

MRN: [id number]                                                                                   

Arrival Date: 10/29/2024                                                                          

Time: 16:10                                                                                       

Account#: [id number]                                                                            

Bed 14                                                                                            

Private MD:                                                                                       

ED Physician Kena Marie                                                                         

HPI:                                                                                              

10/29                                                                                             

17:56 This 28 yrs old Black Male presents to ER via [employer] with complaints of chest  rt  

      pain.                                                                                       

17:56 Patient presents to the ED with chest pain starting just prior to arrival. Patient was  rt  

      sitting in halfway when this happened. Orts pain is worse with inspiration. Denies any         

      difficulty breathing. Denies of acute complaints, symptoms are moderate in severity, no     

      other aggravating or alleviating factors..                                                  

                                                                                                  

Historical:                                                                                       

- Allergies:                                                                                      

16:37 NKA;                                                                                    ar6 

                                                                                                  

- Immunization history:: Adult Immunizations up to date.                                          

- Infectious Disease History:: Denies.                                                            

- Social history:: Smoking status: Patient reports the use of cigarette tobacco                   

  products, 1.5 packs per day.                                                                    

- Family history:: not pertinent.                                                                 

                                                                                                  

                                                                                                  

ROS:                                                                                              

17:56 Constitutional: Negative for fever, chills, and weight loss, Respiratory: Negative for  rt  

      shortness of breath, cough, wheezing, and pleuritic chest pain, Abdomen/GI: Negative        

      for abdominal pain, nausea, vomiting, diarrhea, and constipation, MS/Extremity:             

      Negative for injury and deformity, Skin: Negative for injury, rash, and discoloration,      

      Neuro: Negative for headache, weakness, numbness, tingling, and seizure,                    

17:56 Cardiovascular: Positive for chest pain, Negative for edema,                                

                                                                                                  

Exam:                                                                                             

17:56 Constitutional:  This is a well developed, well nourished patient who is awake, alert,  rt  

      and in no acute distress. Head/Face:  Normocephalic, atraumatic. Cardiovascular:            

      Regular rate and rhythm with a normal S1 and S2.  No gallops, murmurs, or rubs.  Normal     

      PMI, no JVD.  No pulse deficits. Respiratory:  Lungs have equal breath sounds               

      bilaterally, clear to auscultation and percussion.  No rales, rhonchi or wheezes noted.     

       No increased work of breathing, no retractions or nasal flaring. Abdomen/GI:  Soft,        

      non-tender, with normal bowel sounds.  No distension or tympany.  No guarding or            

      rebound.  No evidence of tenderness throughout. Skin:  Warm, dry with normal turgor.        

      Normal color with no rashes, no lesions, and no evidence of cellulitis. MS/ Extremity:      

      Pulses equal, no cyanosis.  Neurovascular intact.  Full, normal range of motion. Neuro:     

       Awake and alert, GCS 15, oriented to person, place, time, and situation.  Cranial          

      nerves II-XII grossly intact.  Motor strength 5/5 in all extremities.  Sensory grossly      

      intact.  Cerebellar exam normal.  Normal gait.                                              

17:56 Chest/axilla: Palpation over anterior chest wall reproduces chest pain.                     

17:56 ECG was reviewed by the Attending Physician.                                                

                                                                                                  

Vital Signs:                                                                                      

16:30  / 78; Pulse 62; Resp 18; Temp 98.2; Pulse Ox 100% on R/A; Weight 72.57 kg;       ar6 

      Height 5 ft. 9 in. ; Pain 5/10;                                                             

16:37  / 78; Pulse 62; Resp 18; Pulse Ox 100% ;                                         ar6 

17:58  / 76; Pulse 58; Resp 18; Pulse Ox 98% on R/A;                                    ph  

18:46  / 70; Pulse 61; Resp 18; Pulse Ox 99% on R/A;                                    ar6 

16:30 Body Mass Index 23.63 (72.57 kg, 175.26 cm)                                             ar6 

16:30 Pain Scale: Adult                                                                       ar6 

                                                                                                  

MDM:                                                                                              

16:37 Medical Screening Exam initiated                                                        rt  

18:31 ED course: Patient signed out to me by Dr. Telles. 28-year-old male with chest pain sp3 

      with labs pending. Troponin and remainder of labs negative except for mild drop in          

      potassium which we will replenish orally. Patient with normal vital signs and we will       

      safely discharge home at this time..                                                        

                                                                                                  

10/29                                                                                             

16:38 Order name: Basic Metabolic Panel; Complete Time: 18:30                                 rt  

10/29                                                                                             

16:38 Order name: CBC with Diff; Complete Time: 17:56                                         rt  

10/29                                                                                             

16:38 Order name: LFT's; Complete Time: 18:30                                                 rt  

10/29                                                                                             

16:38 Order name: Troponin HS; Complete Time: 18:30                                           rt  

10/29                                                                                             

16:38 Order name: XRAY Chest (1 view)                                                         rt  

10/29                                                                                             

16:38 Order name: Cardiac monitoring; Complete Time: 17:19                                    rt  

10/29                                                                                             

16:38 Order name: EKG - Nurse/Tech; Complete Time: 17:57                                      rt  

10/29                                                                                             

16:38 Order name: IV Saline Lock; Complete Time: 17:57                                        rt  

10/29                                                                                             

16:38 Order name: Labs collected and sent; Complete Time: 17:57                               rt  

10/29                                                                                             

16:38 Order name: O2 Per Protocol; Complete Time: 17:19                                       rt  

10/29                                                                                             

16:38 Order name: O2 Sat Monitoring; Complete Time: 17:19                                     rt  

                                                                                                  

EC:56 Rate is 59 beats/min. Rhythm is regular, Sinus bradycardia with No ectopy. QRS Axis is  rt  

      Normal. VA interval is normal. QRS interval is normal. QT interval is normal. T waves       

      are Normal. No ST changes noted. Interpreted by me.                                         

                                                                                                  

Administered Medications:                                                                         

17:57 Drug: Ketorolac IM 15 mg IM once Route: IM; Site: right deltoid;                        ph  

18:47 Follow up: Response: No adverse reaction                                                ar6 

18:46 Drug: Potassium Chloride PO Liquid 40 mEq PO once Route: PO;                            ar6 

18:47 Follow up: Response: No adverse reaction                                                ar6 

                                                                                                  

                                                                                                  

Disposition Summary:                                                                              

10/29/24 18:32                                                                                    

Discharge Ordered                                                                                 

 Notes:       Location: Home                                                                        
  sp3

      Condition: Stable                                                                       sp3 

      Diagnosis                                                                                   

        - Chest pain, hypokalemia                                                             sp3 

      Followup:                                                                               sp3 

        - With: Private Physician                                                                  

        - When: Upon discharge from the Emergency Department                                       

        - Reason: Recheck today's complaints                                                       

      Discharge Instructions:                                                                     

        - Discharge Summary Sheet                                                             sp3 

        - Nonspecific Chest Pain, Adult                                                       sp3 

      Forms:                                                                                      

        - Medication Reconciliation Form                                                      sp3 

        - Antibiotic Education                                                                sp3 

        - Prescription Opioid Use                                                             sp3 

        - Patient Portal Instructions                                                         sp3 

        - Leadership Thank You Letter                                                         sp3 

Signatures:                                                                                       

Dispatcher [employer]                           Wilma Heart RN                      RN                                                      

Kena Marie MD MD   sp3                                                  

Jonn Tan MD MD   rt                                                   

Georgia Estrada RN                      RN   ar6                                                  

                                                                                                  

Corrections: (The following items were deleted from the chart)                                    

16:38 16:37 PMHx: Asthma; ar6                                                                 ar6 

                                                                                                  

**************************************************************************************************

## 2024-10-29 NOTE — RAD REPORT
EXAMINATION: ONE VIEW CHEST XR



CLINICAL INDICATION: Male, 28 years old.,CHEST PAIN



TECHNIQUE: Frontal chest projection is submitted. Examination is limited by patient positioning and t
echnique.



COMPARISON: 12/16/2020



FINDINGS:

The lungs are mildly under inflated and clear.  No pneumothorax or sizable effusion. The heart is nor
mal in size. Mediastinal contours are unremarkable.



IMPRESSION: 



No acute intrathoracic abnormalities. 







Reported By: Jose Rodriguez 

Electronically Signed:  10/29/2024 6:54 PM

## 2024-10-30 NOTE — EKG
Test Date:    2024-10-29               Test Time:    17:37:29

Technician:   PH                                     

                                                     

MEASUREMENT RESULTS:                                       

Intervals:                                           

Rate:         59                                     

IL:           174                                    

QRSD:         92                                     

QT:           426                                    

QTc:          421                                    

Axis:                                                

P:            29                                     

IL:           174                                    

QRS:          11                                     

T:            22                                     

                                                     

INTERPRETIVE STATEMENTS:                                       

                                                     

Sinus bradycardia with marked sinus arrhythmia

Otherwise normal ECG

Compared to ECG 03/13/2018 09:30:09

Early repolarization no longer present



Electronically Signed On 10-30-24 14:45:57 CDT by Marvin Cerda

## 2024-12-13 ENCOUNTER — HOSPITAL ENCOUNTER (EMERGENCY)
Dept: HOSPITAL 97 - ER | Age: 29
Discharge: HOME | End: 2024-12-13
Payer: COMMERCIAL

## 2024-12-13 VITALS — SYSTOLIC BLOOD PRESSURE: 133 MMHG | DIASTOLIC BLOOD PRESSURE: 99 MMHG | OXYGEN SATURATION: 97 %

## 2024-12-13 VITALS — TEMPERATURE: 97.5 F

## 2024-12-13 DIAGNOSIS — E87.6: Primary | ICD-10-CM

## 2024-12-13 DIAGNOSIS — E83.42: ICD-10-CM

## 2024-12-13 LAB
ALBUMIN SERPL BCP-MCNC: 3.8 G/DL (ref 3.4–5)
ALBUMIN/GLOB SERPL: 1.2 {RATIO} (ref 1.1–1.8)
ALP SERPL-CCNC: 150 U/L (ref 45–117)
ALT SERPL W P-5'-P-CCNC: 44 U/L (ref 16–61)
ANION GAP SERPL CALC-SCNC: 7 MEQ/L (ref 5–15)
AST SERPL W P-5'-P-CCNC: 39 U/L (ref 15–37)
BILIRUB INDIRECT SERPL-MCNC: 0.4 MG/DL (ref 0.2–0.8)
BUN BLD-MCNC: 13 MG/DL (ref 7–18)
GLOBULIN SER CALC-MCNC: 3.1 G/DL (ref 2.3–3.5)
GLUCOSE SERPLBLD-MCNC: 97 MG/DL (ref 74–106)
HCT VFR BLD CALC: 40.7 % (ref 39.6–49)
HGB BLD-MCNC: 14.3 G/DL (ref 13.6–17.9)
LYMPHOCYTES # SPEC AUTO: 1.7 K/UL (ref 0.7–4.9)
MAGNESIUM SERPL-MCNC: 1 MG/DL (ref 1.6–2.4)
MCH RBC QN AUTO: 31.3 PG (ref 27–35)
MCHC RBC AUTO-ENTMCNC: 35.3 G/DL (ref 32–36)
MCV RBC: 88.6 FL (ref 80–100)
METHAMPHET UR QL SCN: NEGATIVE
NRBC # BLD: 0 10*3/UL (ref 0–0)
NRBC BLD AUTO-RTO: 0.1 % (ref 0–0)
NT-PROBNP SERPL-MCNC: 23 PG/ML (ref ?–125)
PMV BLD: 9.3 FL (ref 7.6–11.3)
POTASSIUM SERPL-SCNC: 3 MEQ/L (ref 3.5–5.1)
RBC # BLD: 4.59 M/UL (ref 4.33–5.43)
THC SERPL-MCNC: NEGATIVE NG/ML
TROPONIN I SERPL HS-MCNC: 8.6 PG/ML (ref ?–58.9)
WBC # BLD AUTO: 7.8 THOU/UL (ref 4.3–10.9)

## 2024-12-13 PROCEDURE — 99284 EMERGENCY DEPT VISIT MOD MDM: CPT

## 2024-12-13 PROCEDURE — 83735 ASSAY OF MAGNESIUM: CPT

## 2024-12-13 PROCEDURE — 80307 DRUG TEST PRSMV CHEM ANLYZR: CPT

## 2024-12-13 PROCEDURE — 80076 HEPATIC FUNCTION PANEL: CPT

## 2024-12-13 PROCEDURE — 84484 ASSAY OF TROPONIN QUANT: CPT

## 2024-12-13 PROCEDURE — 71045 X-RAY EXAM CHEST 1 VIEW: CPT

## 2024-12-13 PROCEDURE — 83880 ASSAY OF NATRIURETIC PEPTIDE: CPT

## 2024-12-13 PROCEDURE — 85379 FIBRIN DEGRADATION QUANT: CPT

## 2024-12-13 PROCEDURE — 80048 BASIC METABOLIC PNL TOTAL CA: CPT

## 2024-12-13 PROCEDURE — 36415 COLL VENOUS BLD VENIPUNCTURE: CPT

## 2024-12-13 PROCEDURE — 85025 COMPLETE CBC W/AUTO DIFF WBC: CPT

## 2024-12-13 NOTE — RAD REPORT
EXAM: Chest Single View



HISTORY: CHEST PAIN



COMPARISON: 10/29/2024



FINDINGS:

LUNGS/PLEURA: The lungs are clear. No pleural effusions or pneumothorax. No pulmonary edema. 

MEDIASTINUM: The mediastinal silhouette is within normal limits.

CARDIAC: The cardiac silhouette is within normal limits.

UPPER ABDOMEN: No significant abnormality.

BONES: No acute fracture.

LINES/TUBES/OTHER: N/A



IMPRESSION:

No evidence of acute cardiopulmonary disease.



Reported By: Harmeet Rosales 

Electronically Signed:  12/13/2024 2:46 PM

## 2024-12-13 NOTE — EDPHYS
Physician Documentation                                                                           

 [facility]                                                                 

Name: Miles Verma                                                                             

Age: 29 yrs                                                                                       

Sex: Male                                                                                         

: 1995                                                                                   

MRN: [id number]                                                                                   

Arrival Date: 2024                                                                          

Time: 13:28                                                                                       

Account#: [id number]                                                                            

Bed 16                                                                                            

Private MD:                                                                                       

ED Physician Dioni Aguilera                                                                      

HPI:                                                                                              

                                                                                             

14:01 This 29 yrs old Black Male presents to ER via Ambulatory with complaints of Chest Pain  kb  

      - upon inspiration.                                                                         

14:01 Pt is a 29 year old male who presents for pain to [facility] chest that started around   kb  

      0900 this morning while in custodial. Reports he has had similar pain in the past. Denies        

      n/v, shortness of breath, palpitations. .                                                   

                                                                                                  

Historical:                                                                                       

- Allergies:                                                                                      

13:43 NKA;                                                                                    aa5 

- PMHx:                                                                                           

13:43 Asthma;                                                                                 aa5 

                                                                                                  

- Immunization history:: Adult Immunizations unknown.                                             

- Infectious Disease History:: Denies.                                                            

- Social history:: Smoking status: Patient denies any tobacco usage or history of.                

                                                                                                  

                                                                                                  

ROS:                                                                                              

14:01 Constitutional: As per HPI                                                              kb  

                                                                                                  

Exam:                                                                                             

14:01 Constitutional:  This is a well developed, well nourished patient who is awake, alert,  kb  

      and in no acute distress. Head/Face:  Normocephalic, atraumatic. ENT:  Moist Mucous         

      membranes Cardiovascular:  Regular rate  Respiratory:  Respirations even and unlabored.     

      No increased work of breathing. Talking in full sentences Skin:  Warm, dry with normal      

      turgor.  Normal color. MS/ Extremity:  Pulses equal, no cyanosis.  Neurovascular            

      intact.  Full, normal range of motion. Neuro:  Awake and alert, GCS 15, oriented to         

      person, place, time, and situation.                                                         

14:16 ECG was reviewed by the Attending Physician.                                            kb  

                                                                                                  

Vital Signs:                                                                                      

13:43  / 99; Pulse 71; Resp 18 S; Temp 97.5(O); Pulse Ox 97% on R/A; Weight 77.11 kg    aa5 

      (R); Height 5 ft. 9 in. ;                                                                   

15:36  / 86; Pulse 56; Resp 18; Temp 97.5; Pulse Ox 100% on R/A; MAP 95 mmHg; Pain 0/10;tm6 

13:43 Body Mass Index 25.10 (77.11 kg, 175.26 cm)                                             aa5 

15:36 Pain Scale: Adult                                                                       tm6 

                                                                                                  

MDM:                                                                                              

13:43 Medical Screening Exam initiated                                                        kb  

14:01 Data reviewed: vital signs, nurses notes.                                               kb  

14:02 Historians other than the Patient: EMS: [facility].                                      kb  

15:45 Differential diagnosis: arrhythmia, acute mi, abnormal electrolytes, asthma             kb  

      exacerbation. Counseling: I had a detailed discussion with the patient and/or guardian      

      regarding the historical points, exam findings, and any diagnostic results supporting       

      the discharge/admit diagnosis, lab results, radiology results, the need for outpatient      

      follow up, a family practitioner, to return to the emergency department if symptoms         

      worsen or persist or if there are any questions or concerns that arise at home. ED          

      course: Pt does not want to wait for magnesium or IVF infusions. Educated on low            

      magnesium and that it would be better to get the IV dose prior to leaving. Pt states he     

      is feeling better and would just like to leave. Recommended daily vitamins. .               

                                                                                                  

                                                                                             

13:55 Order name: Basic Metabolic Panel; Complete Time: 15:00                                 kb  

                                                                                             

13:55 Order name: CBC with Diff; Complete Time: 14:43                                         kb  

                                                                                             

13:55 Order name: D-Dimer; Complete Time: 14:53                                               kb  

                                                                                             

13:55 Order name: LFT's; Complete Time: 15:00                                                 kb  

                                                                                             

13:55 Order name: Magnesium; Complete Time: 15:00                                             kb  

                                                                                             

13:55 Order name: NT PRO-BNP; Complete Time: 15:00                                            kb  

                                                                                             

13:55 Order name: Troponin HS; Complete Time: 15:00                                           kb  

                                                                                             

13:55 Order name: UDS; Complete Time: 14:43                                                   kb  

                                                                                             

13:55 Order name: XRAY Chest (1 view); Complete Time: 14:53                                   kb  

                                                                                             

13:55 Order name: Cardiac monitoring; Complete Time: 14:15                                    kb  

                                                                                             

13:55 Order name: EKG - Nurse/Tech; Complete Time: 14:15                                      kb  

                                                                                             

13:55 Order name: IV Saline Lock; Complete Time: 14:24                                        kb  

                                                                                             

13:55 Order name: Labs collected and sent; Complete Time: 14:24                               kb  

                                                                                             

13:55 Order name: O2 Per Protocol; Complete Time: 14:15                                       kb  

                                                                                             

13:55 Order name: O2 Sat Monitoring; Complete Time: 14:15                                     kb  

                                                                                                  

EC:16 Rate is 55 beats/min. Rhythm is regular. QRS Axis is Normal. [place] interval is normal at   kb  

      148 msec. QRS interval is normal at 104 msec. QT interval is normal at 392 msec.            

                                                                                                  

Administered Medications:                                                                         

15:35 Not Given (Patient Refused): ns 0.9% 1000 ml IV at 1000 ml once; to be given as a bolus tm6 

      over 60 minutes                                                                             

15:35 Not Given (Patient Refused): magnesium sulfate2 grams IVPB once over 2 hrs              tm6 

15:50 Drug: Potassium Chloride PO 40 mEq PO once Route: PO;                                   tm6 

15:52 Follow up: Response: Medication administered at discharge.                              tm6 

15:50 Drug: Magnesium Oxide  mg PO once; administer with meals Route: PO;               tm6 

15:52 Follow up: Response: Medication administered at discharge.                              tm6 

                                                                                                  

                                                                                                  

Disposition Summary:                                                                              

24 15:36                                                                                    

Discharge Ordered                                                                                 

 Notes:       Location: Home                                                                        
  kb

      Condition: Stable                                                                       kb  

      Diagnosis                                                                                   

        - Chest pain, unspecified                                                             kb  

        - Hypokalemia                                                                         kb  

        - Hypomagnesemia                                                                      kb  

      Followup:                                                                               kb  

        - With: Emergency Department                                                               

        - When: As needed                                                                          

        - Reason: Worsening of condition                                                           

      Followup:                                                                               kb  

        - With: Private Physician                                                                  

        - When: 2 - 3 days                                                                         

        - Reason: Recheck today's complaints, Continuance of care, Re-evaluation by your           

      physician                                                                                   

      Discharge Instructions:                                                                     

        - Discharge Summary Sheet                                                             kb  

        - Hypomagnesemia                                                                      kb  

        - Nonspecific Chest Pain, Adult, Easy-to-Read                                         kb  

        - Hypokalemia                                                                         kb  

      Forms:                                                                                      

        - Medication Reconciliation Form                                                      kb  

        - Antibiotic Education                                                                kb  

        - Prescription Opioid Use                                                             kb  

        - Patient Portal Instructions                                                         kb  

        - Leadership Thank You Letter                                                         kb  

Signatures:                                                                                       

Dispatcher [employer]                           EDMS                                                 

Courtney Hammonds, FNP-C                 FNP-Renetta Oliva, RN                     RN   aa5                                                  

Lennie Ambrose RN                   RN   tm6                                                  

                                                                                                  

Corrections: (The following items were deleted from the chart)                                    

13:56 13:56 BASIC METABOLIC PANEL+C.LAB.BRZ ordered. EDMS                                     EDMS

13:56 13:56 CBC+H.LAB.BRZ ordered. EDMS                                                       EDMS

13:56 13:56 D-DIMER+COAG.LAB.BRZ ordered. EDMS                                                EDMS

13:56 13:56 HEPATIC FUNCTION+C.LAB.BRZ ordered. EDMS                                          EDMS

13:56 13:56 MAGNESIUM+C.LAB.BRZ ordered. EDMS                                                 EDMS

13:56 13:56 PROBNP+C.LAB.BRZ ordered. EDMS                                                    EDMS

13:56 13:56 Troponin High Sensitivity+C.LAB.BRZ ordered. EDMS                                 EDMS

13:56 13:56 URINE DRUG SCREEN+UC.LAB.BRZ ordered. EDMS                                        EDMS

13:56 13:56 Chest Single View+RAD.RAD.BRZ ordered. EDMS                                       EDMS

14:53 14:01 Pt is a 29 year old male who presents for pain to center of chest that started    kb  

      around 0900 this morning. Reports he has had similar pain in the past. Denies n/v,          

      shortness of breath, palpitations. . kb                                                     

                                                                                                  

**************************************************************************************************

## 2024-12-13 NOTE — ER
Nurse's Notes                                                                                     

 [facility]                                                                 

Name: Miles Verma                                                                             

Age: 29 yrs                                                                                       

Sex: Male                                                                                         

: 1995                                                                                   

MRN: [id number]                                                                                   

Arrival Date: 2024                                                                          

Time: 13:28                                                                                       

Account#: [id number]                                                                            

Bed 16                                                                                            

Private MD:                                                                                       

Diagnosis: Chest pain, unspecified;Hypokalemia;Hypomagnesemia                                     

                                                                                                  

Presentation:                                                                                     

                                                                                             

13:43 Chief complaint: Patient states: chest pain upon inspiration since 0900 today, reports  aa5 

      hx of Asthma and has been out of inhaler x 2 weeks.                                         

13:43 Coronavirus screen: At this time, the client does not indicate any symptoms associated  aa5 

      with coronavirus-19. Ebola Screen: Patient denies travel to an Ebola-affected area in       

      the 21 days before illness onset. Initial Sepsis Screen: Does the patient meet any 2        

      criteria? No. Patient's initial sepsis screen is negative. Does the patient have a          

      suspected source of infection? No. Patient's initial sepsis screen is negative. Risk        

      Assessment: Do you want to hurt yourself or someone else? Patient reports no desire to      

      harm self or others. Onset of symptoms was 2024.                               

13:43 Acuity: JANAE 3                                                                           aa5 

13:43 Method Of Arrival: Ambulatory                                                           aa5 

                                                                                                  

Historical:                                                                                       

- Allergies:                                                                                      

13:43 NKA;                                                                                    aa5 

- PMHx:                                                                                           

13:43 Asthma;                                                                                 aa5 

                                                                                                  

- Immunization history:: Adult Immunizations unknown.                                             

- Infectious Disease History:: Denies.                                                            

- Social history:: Smoking status: Patient denies any tobacco usage or history of.                

                                                                                                  

                                                                                                  

Screenin:43 [facility] ED Fall Risk Assessment (Adult) History of falling in the last 3 months,       tm6 

      including since admission No falls in past 3 months (0 pts) Confusion or Disorientation     

      No (0 pts) Intoxicated or Sedated No (0 pts) Impaired Gait No (0 pts) Mobility Assist       

      Device Used No (0 pt) Altered Elimination No (0 pt) Score/Fall Risk Level 0 - 2 = Low       

      Risk Oriented to surroundings, Maintained a safe environment, Educated pt \T\ family on     

      fall prevention, incl call for assistance when getting out of bed. Abuse screen: Denies     

      threats or abuse. Denies injuries from another. Nutritional screening: No deficits          

      noted. Tuberculosis screening: No symptoms or risk factors identified.                      

                                                                                                  

Assessment:                                                                                       

13:43 General: Appears in no apparent distress. Behavior is calm, cooperative. Pain:          tm6 

      Complains of pain in chest Pain does not radiate. Pain began 0900. Neuro: Level of          

      Consciousness is awake, alert, obeys commands, Oriented to person, place, time,             

      situation. Cardiovascular: Reports chest pain, Patient's skin is warm and dry.              

      Respiratory: Airway is patent Respiratory effort is even, unlabored, Respiratory            

      pattern is regular, symmetrical. GI: No signs and/or symptoms were reported involving       

      the gastrointestinal system. Abdomen is flat, non-distended. : No signs and/or            

      symptoms were reported regarding the genitourinary system. EENT: No signs and/or            

      symptoms were reported regarding the EENT system. Derm: No signs and/or symptoms            

      reported regarding the dermatologic system. Musculoskeletal: No signs and/or symptoms       

      reported regarding the musculoskeletal system.                                              

                                                                                                  

Vital Signs:                                                                                      

13:43  / 99; Pulse 71; Resp 18 S; Temp 97.5(O); Pulse Ox 97% on R/A; Weight 77.11 kg    aa5 

      (R); Height 5 ft. 9 in. ;                                                                   

15:36  / 86; Pulse 56; Resp 18; Temp 97.5; Pulse Ox 100% on R/A; MAP 95 mmHg; Pain 0/10;tm6 

13:43 Body Mass Index 25.10 (77.11 kg, 175.26 cm)                                             aa5 

15:36 Pain Scale: Adult                                                                       tm6 

                                                                                                  

ED Course:                                                                                        

13:43 Patient arrived in ED.                                                                  aa5 

13:43 Courtney Hammonds FNP-C is [facility].                                                        kb  

13:43 Dioni Aguilera MD is Attending Physician.                                             kb  

13:43 Arm band placed on.                                                                     aa5 

13:43 Patient has correct armband on for positive identification. Bed in low position. Call   tm6 

      light in reach. Side rails up X 1. Provided Education on: use of call bell. Client          

      placed on continuous cardiac and pulse oximetry monitoring. NIBP monitoring applied.        

      Cardiac monitor on. Pulse ox on. NIBP on. Door closed. Noise minimized. Warm blanket        

      given. Pillow given. PO fluids given.                                                       

13:43 No provider procedures requiring assistance completed. Patient maintains SpO2           tm6 

      saturation greater than 95% on room air.                                                    

13:45 Triage completed.                                                                       aa5 

14:06 Lennie Ambrose RN is Primary Nurse.                                                 tm6 

14:15 EKG done, by ED staff, reviewed by Courtney STEWART.                               tm6 

14:24 Basic Metabolic Panel Sent.                                                             em1 

14:24 CBC with Diff Sent.                                                                     em1 

14:24 D-Dimer Sent.                                                                           em1 

14:24 LFT's Sent.                                                                             em1 

14:24 Magnesium Sent.                                                                         em1 

14:24 NT PRO-BNP Sent.                                                                        em1 

14:24 Troponin HS Sent.                                                                       em1 

14:24 Initial lab(s) drawn, by me, sent to lab. Inserted saline lock: 20 gauge in left        em1 

      antecubital area, using aseptic technique. Blood collected. Flushed with 10 mL NS.          

14:46 XRAY Chest (1 view) In Process Unspecified.                                             EDMS

14:50 Patient requests food.                                                                  kc6 

15:52 IV discontinued, intact, bleeding controlled, No redness/swelling at site. Pressure     tm6 

      dressing applied.                                                                           

                                                                                                  

Administered Medications:                                                                         

15:35 Not Given (Patient Refused): ns 0.9% 1000 ml IV at 1000 ml once; to be given as a bolus tm6 

      over 60 minutes                                                                             

15:35 Not Given (Patient Refused): magnesium sulfate2 grams IVPB once over 2 hrs              tm6 

15:50 Drug: Potassium Chloride PO 40 mEq PO once Route: PO;                                   tm6 

15:52 Follow up: Response: Medication administered at discharge.                              tm6 

15:50 Drug: Magnesium Oxide  mg PO once; administer with meals Route: PO;               tm6 

15:52 Follow up: Response: Medication administered at discharge.                              tm6 

                                                                                                  

                                                                                                  

Medication:                                                                                       

13:43 VIS not applicable for this client.                                                     tm6 

                                                                                                  

Outcome:                                                                                          

15:36 Discharge ordered by MD. alcocer  

15:52 Discharged to home ambulatory,                                                          tm6 

15:52 Condition: stable                                                                           

15:52 Discharge instructions given to patient, Instructed on discharge instructions, follow       

      up and referral plans. Demonstrated understanding of instructions, follow-up care,          

15:52 Patient left the ED.                                                                    tm6 

                                                                                                  

Signatures:                                                                                       

Dispatcher [employer]                           EDMS                                                 

Courtney Hammonds FNP-C FNP-Paulo Mckeon                               em1                                                  

Renetta Vallejo, Bozena Jiménez RN, RN                   RN   kc6                                                  

Lennie Ambrose RN                   RN   tm6                                                  

                                                                                                  

**************************************************************************************************

## 2025-01-31 ENCOUNTER — HOSPITAL ENCOUNTER (EMERGENCY)
Dept: HOSPITAL 97 - ER | Age: 30
LOS: 1 days | Discharge: HOME | End: 2025-02-01
Payer: COMMERCIAL

## 2025-01-31 DIAGNOSIS — J02.0: Primary | ICD-10-CM

## 2025-01-31 DIAGNOSIS — Z11.52: ICD-10-CM

## 2025-01-31 PROCEDURE — 94640 AIRWAY INHALATION TREATMENT: CPT

## 2025-01-31 PROCEDURE — 71046 X-RAY EXAM CHEST 2 VIEWS: CPT

## 2025-01-31 PROCEDURE — 87081 CULTURE SCREEN ONLY: CPT

## 2025-01-31 PROCEDURE — 36415 COLL VENOUS BLD VENIPUNCTURE: CPT

## 2025-01-31 PROCEDURE — 87811 SARS-COV-2 COVID19 W/OPTIC: CPT

## 2025-01-31 PROCEDURE — 87804 INFLUENZA ASSAY W/OPTIC: CPT

## 2025-01-31 PROCEDURE — 99285 EMERGENCY DEPT VISIT HI MDM: CPT

## 2025-01-31 NOTE — RAD REPORT
EXAM: Chest Pa And Lat (2 Views)



HISTORY: 29 years Male Cough;Congestion



COMPARISON: 12/13/2024



FINDINGS:

LUNGS/PLEURA: The lungs are clear. No pleural effusions or pneumothorax. No pulmonary edema. 

MEDIASTINUM: The mediastinal silhouette is within normal limits

CARDIAC: The cardiac silhouette is within normal limits.

UPPER ABDOMEN: No significant abnormality.

BONES: No acute abnormality.

LINES/TUBES/OTHER: N/A



IMPRESSION:

No evidence of acute cardiopulmonary disease.



Reported By: Harmeet Rosales 

Electronically Signed:  1/31/2025 10:26 PM

## 2025-01-31 NOTE — XMS REPORT
Continuity of Care Document



                          Created on: 2025





EULALIO LAGUNAS

External Reference #: [id number]

: 1995

Sex: Male



Demographics





                                        Address             [facility]

[place]  [address]

 

                                        Home Phone          [phone]

 

                                        Work Phone          [phone]

 

                                        Email Address       [email or web address]

[email or web address]

 

                                        Preferred Language  English

 

                                        Marital Status      Unknown

 

                                        Orthodox Affiliation Unknown

 

                                        Race                Unknown

 

                                        Ethnic Group        Unknown





Author





                                        Name                Unknown

 

                                        Address             [address] [place] [address] [address]

[place] [id number]

 

                                        [facility]

[employer]

 

                                        Address             [address] [place] [address] [address]

[place] [address]

 

                                        Phone               [phone]





Support





                          Name         Relationship Address      Phone

 

                                NO, ONE         [place]              [address]

OWN

[place], [place] [address]                       [phone]

 

                                [place], [place]         [place]              [address]

OWN

[place], [facility]                       [phone]





Care Team Providers





                                Care Team Member Name Role            Phone

 

                                Penny Mc    Attending Clinician Unavailable

 

                                Physician, No Primary or Family Admitting Clinic

bossman Unavailable







Payers





                    Payer Name Policy Type Policy Number Effective Date Expirati

on Date Source







Allergies, Adverse Reactions, Alerts





                                                    Allergy 

Name                                    Allergy 

Type            Status          Severity        Reaction(s)     Onset 

Date                                    Inactive 

Date                                    Treating 

Clinician                 Comments                  Source

 

                                                    No Known 

Allergie

s            DA           Active       U                          

00:00:

00                                                              [facility]







Encounters





                                                    Start 

Date/Time                               End 

Date/Time                               Encounter 

Type                                    Admission 

Type                                    Attending 

Clinicians                              Care 

Facility                                Care 

Department                              Encounter 

ID                                      Source

 

                                                    2023 

19:10:00                                2023 

04:22:00            Emergency           TR                  Penny Mc                [phone]

93                                      [facility]

## 2025-02-01 VITALS — OXYGEN SATURATION: 96 % | TEMPERATURE: 98 F | SYSTOLIC BLOOD PRESSURE: 126 MMHG | DIASTOLIC BLOOD PRESSURE: 80 MMHG

## 2025-02-01 LAB — SARS-COV+SARS-COV-2 AG RESP QL IA.RAPID: (no result)

## 2025-02-01 NOTE — ER
Nurse's Notes                                                                                     

 [facility]                                                                 

Name: Miles Verma                                                                             

Age: 29 yrs                                                                                       

Sex: Male                                                                                         

: 1995                                                                                   

MRN: [id number]                                                                                   

Arrival Date: 2025                                                                          

Time: 21:21                                                                                       

Account#: [id number]                                                                            

Bed 6                                                                                             

Private MD:                                                                                       

Diagnosis: Streptococcal pharyngitis                                                              

                                                                                                  

Presentation:                                                                                     

                                                                                             

21:55 Chief complaint: Patient states: c/o cough, chest pain r/t coughing, nausea, vomiting,  al5 

      fever, sore throat, and pain with breathing x3-4 days. denies being around anyone sick.     

      Coronavirus screen: cough unrelated to allergies, fever, nausea, sore throat, vomiting.     

      Ebola Screen: No symptoms or risks identified at this time. Initial Sepsis Screen: Does     

      the patient meet any 2 criteria? No. Patient's initial sepsis screen is negative. Does      

      the patient have a suspected source of infection? No. Patient's initial sepsis screen       

      is negative. Risk Assessment: Do you want to hurt yourself or someone else? Patient         

      reports no desire to harm self or others. Onset of symptoms was 2025.           

21:55 Method Of Arrival: Ambulatory                                                           al5 

21:55 Acuity: JANAE 4                                                                           al5 

                                                                                                  

Triage Assessment:                                                                                

21:57 General: Appears in no apparent distress. uncomfortable, Behavior is calm, cooperative. al5 

      Pain: Complains of pain in chest Pain currently is 7 out of 10 on a pain scale. EENT:       

      No signs and/or symptoms were reported regarding the EENT system. Neuro: Level of           

      Consciousness is awake, alert, obeys commands, Oriented to person, place, time,             

      situation. Cardiovascular: Capillary refill < 3 seconds Patient's skin is warm and dry.     

      Respiratory: Reports cough that is pain with respiration Airway is patent Respiratory       

      effort is even, unlabored, Respiratory pattern is regular, symmetrical, Onset: The          

      symptoms/episode began/occurred 3-4 days ago, the patient has mild shortness of breath.     

      GI: Reports nausea, vomiting. : No signs and/or symptoms were reported regarding the      

      genitourinary system. Derm: Skin is intact, is healthy with good turgor, Skin is pink,      

      warm \T\ dry. normal. Musculoskeletal: No signs and/or symptoms reported regarding the      

      musculoskeletal system.                                                                     

                                                                                             

00:39 Cardiovascular: Heart tones S1 S2 present Capillary refill < 3 seconds Patient's skin   bm8 

      is warm and dry. Rhythm is sinus rhythm. Respiratory: Airway is patent Respiratory          

      effort is even, unlabored, Respiratory pattern is regular, symmetrical, Denies              

      shortness of breath at rest.                                                                

                                                                                                  

Historical:                                                                                       

- Allergies:                                                                                      

                                                                                             

21:57 No Known Allergies;                                                                     al5 

- PMHx:                                                                                           

21:57 Asthma;                                                                                 al5 

- PSHx:                                                                                           

21:57 None;                                                                                   al5 

                                                                                                  

- Immunization history:: Adult Immunizations up to date.                                          

- Infectious Disease History:: Denies.                                                            

- Social history:: Smoking status: Patient denies any tobacco usage or history of.                

                                                                                                  

                                                                                                  

Screenin:58 [facility] ED Fall Risk Assessment (Adult) History of falling in the last 3 months,       al5 

      including since admission No falls in past 3 months (0 pts) Confusion or Disorientation     

      No (0 pts) Intoxicated or Sedated No (0 pts) Impaired Gait No (0 pts) Mobility Assist       

      Device Used No (0 pt) Altered Elimination No (0 pt) Score/Fall Risk Level 0 - 2 = Low       

      Risk Oriented to surroundings, Maintained a safe environment, Hourly rounding (assess       

      needs \T\ fall precautionary measures) done. Abuse screen: Denies threats or abuse.         

      Denies injuries from another. Nutritional screening: No deficits noted.                     

21:58 Tuberculosis screening: No symptoms or risk factors identified.                         al5 

                                                                                                  

Assessment:                                                                                       

21:57 Reassessment: see triage assessment.                                                    al5 

21:57 Respiratory: Airway is patent Respiratory effort is even, unlabored, Respiratory        al5 

      pattern is regular, symmetrical.                                                            

23:54 Reassessment: Patient appears in no apparent distress at this time. Patient and/or      bm8 

      family updated on plan of care and expected duration. Pain level reassessed. Patient is     

      alert, oriented x 3, equal unlabored respirations, skin warm/dry/pink. Patient denies       

      pain at this time. General: Appears in no apparent distress. comfortable, Behavior is       

      calm, cooperative, quiet. Pain: Denies pain. Neuro: No deficits noted. Level of             

      Consciousness is awake, alert, obeys commands, Oriented to person, place, time,             

      situation, Appropriate for age  are equal bilaterally Moves all extremities. Full      

      function Speech is normal, Facial symmetry appears normal, Facial symmetry: tongue is       

      midline. Cardiovascular: No deficits noted. Denies chest pain, Heart tones S1 S2            

      present Capillary refill < 3 seconds in bilateral fingers Patient's skin is warm and        

      dry. Rhythm is sinus rhythm. Respiratory: Reports cough that is non-productive, dry,        

      Airway is patent Respiratory effort is even, unlabored, Respiratory pattern is regular,     

      symmetrical, Breath sounds with wheezes bilaterally. in right upper lobe, left upper        

      lobe, left posterior upper lobe and right posterior upper lobe the patient has mild         

      shortness of breath. GI: No deficits noted. No signs and/or symptoms were reported          

      involving the gastrointestinal system. : No deficits noted. No signs and/or symptoms      

      were reported regarding the genitourinary system. EENT: No deficits noted. No signs         

      and/or symptoms were reported regarding the EENT system. Derm: No deficits noted. No        

      signs and/or symptoms reported regarding the dermatologic system. Musculoskeletal: No       

      deficits noted. No signs and/or symptoms reported regarding the musculoskeletal system.     

                                                                                                  

Vital Signs:                                                                                      

21:55  / 86; Pulse 77; Resp 16; Temp 98.6; Pulse Ox 100% on R/A; Weight 81.65 kg;       al5 

      Height 5 ft. 9 in. ; Pain 7/10;                                                             

23:54  / 73; Pulse 75; Resp 20; Temp 98.6; Pulse Ox 95% on R/A; Pain 0/10;              bm8 

                                                                                             

00:39  / 80; Pulse 94; Resp 20; Temp 98; Pulse Ox 96% on R/A; Pain 0/10;                bm8 

                                                                                             

21:55 Body Mass Index 26.58 (81.65 kg, 175.26 cm)                                             al5 

                                                                                             

21:55 Pain Scale: Adult                                                                       al5 

23:54 Pain Scale: Adult                                                                       bm8 

                                                                                             

00:39 Pain Scale: Adult                                                                       bm8 

                                                                                                  

Delia Coma Score:                                                                               

                                                                                             

23:54 Eye Response: spontaneous(4). Motor Response: obeys commands(6). Verbal Response:       bm8 

      oriented(5). Total: 15.                                                                     

                                                                                                  

ED Course:                                                                                        

21:24 Patient arrived in ED.                                                                  am2 

21:39 Rula Nixon PA-C is PHCP.                                                            sb4 

21:39 Pradeep Montero MD is Attending Physician.                                               sb4 

21:52 Audrey Glover RN is Primary Nurse.                                                 al5 

21:57 Triage completed.                                                                       al5 

21:59 Arm band placed on right wrist. Patient placed in waiting room, in view of staff        al5 

      members, on pulse oximetry, Patient notified of wait time.                                  

21:59 Patient has correct armband on for positive identification. Provided Education on: plan al5 

      of care.                                                                                    

21:59 No provider procedures requiring assistance completed.                                  al5 

22:26 Chest Pa And Lat (2 Views) XRAY In Process Unspecified.                                 EDMS

23:26 Strep Sent.                                                                             lg3 

23:26 Flu Sent.                                                                               lg3 

23:26 SARS RAPID Sent.                                                                        lg3 

23:37 Strep Sent.                                                                             kmf 

23:37 Flu Sent.                                                                               kmf 

23:37 SARS RAPID Sent.                                                                        kmf 

23:46 Donald Hernández, RN is Primary Nurse.                                                    bm8 

23:54 Client placed on continuous cardiac and pulse oximetry monitoring. NIBP monitoring      bm8 

      applied. Pulse ox on. NIBP on. Door closed. Noise minimized. Lights dimmed. Pillow          

      given. Verbal reassurance given. Head of bed elevated.                                      

23:54 Initial Neb Treatment Given as ordered Patient was instructed and evaluated on          bm8 

      procedure Patient tolerated procedure well without adverse effect. Oxygen administered      

      via a nebulizer mask. Response to oxygen therapy: symptoms improved.                        

                                                                                             

00:48 Patient did not have IV access during this emergency room visit.                        bm8 

                                                                                                  

Administered Medications:                                                                         

                                                                                             

23:53 Drug: Ibuprofen  mg PO once Route: PO;                                            bm8 

                                                                                             

00:48 Follow up: Response: No adverse reaction                                                bm8 

                                                                                             

23:53 Drug: DuoNeb Nebulize (3:1) (2.5 mg - 0.5 mg) 3 ml Nebulizer once Route: Nebulizer;     bm8 

                                                                                             

00:47 Follow up: Response: No adverse reaction                                                bm8 

00:33 Drug: Amoxicillin-Clavulanate  mg PO once Route: PO;                              bm8 

00:47 Follow up: Response: Medication administered at discharge.                              bm8 

                                                                                                  

                                                                                                  

Medication:                                                                                       

                                                                                             

21:57 VIS not applicable for this client.                                                     al5 

                                                                                                  

Outcome:                                                                                          

                                                                                             

00:23 Discharge ordered by MD.                                                                sb4 

00:48 Discharged to home ambulatory,                                                          bm8 

00:48 Condition: stable                                                                           

00:48 Discharge instructions given to patient, Instructed on discharge instructions, follow       

      up and referral plans. no drinking with medication, no driving heavy equipment,             

      medication usage, safety practices, Demonstrated understanding of instructions,             

      follow-up care, medications, Prescriptions given X 1,                                       

00:50 Patient left the ED.                                                                    bm8 

                                                                                                  

Signatures:                                                                                       

Dispatcher [employer]                           Audrey Myers                                                  

Charity Hernandez RN                         RN   lg3                                                  

Rula Nixon PAPARVEEN                     PA-C sb4                                                  

Melisa Bauer                     [facility]                                                  

Donald Hernández RN                      RN   bm8                                                  

Audrey Glover, RN                   RN   al5                                                  

                                                                                                  

Corrections: (The following items were deleted from the chart)                                    

                                                                                             

21:57 21:57 Allergies: NKA; al5                                                               al5 

23:27 23:26 COVID swab sent to lab. Flu and/or RSV swab sent to lab. lg3                      lg3 

                                                                                                  

**************************************************************************************************

## 2025-02-01 NOTE — EDPHYS
Physician Documentation                                                                           

 [facility]                                                                 

Name: Miles Verma                                                                             

Age: 29 yrs                                                                                       

Sex: Male                                                                                         

: 1995                                                                                   

MRN: [id number]                                                                                   

Arrival Date: 2025                                                                          

Time: 21:21                                                                                       

Account#: [id number]                                                                            

Bed 6                                                                                             

Private MD:                                                                                       

ED Physician Pradeep Montero                                                                        

HPI:                                                                                              

                                                                                             

00:51 This 29 yrs old Black Male presents to ER via Ambulatory with complaints of Breathing   sb4 

      Difficulty, Cough, Doesn't Feel Right.                                                      

00:51 patient reports feeling poorly for 2 days now- cough, sob, malaise, sore throat, fever. sb4 

      denies any sick contacts. has history of asthma, uses an inhaler intermittently. does       

      endorse some nausea, and vomiting.                                                          

                                                                                                  

Historical:                                                                                       

- Allergies:                                                                                      

                                                                                             

21:57 No Known Allergies;                                                                     al5 

- PMHx:                                                                                           

21:57 Asthma;                                                                                 al5 

- PSHx:                                                                                           

21:57 None;                                                                                   al5 

                                                                                                  

- Immunization history:: Adult Immunizations up to date.                                          

- Infectious Disease History:: Denies.                                                            

- Social history:: Smoking status: Patient denies any tobacco usage or history of.                

                                                                                                  

                                                                                                  

ROS:                                                                                              

02                                                                                             

00:51 Abdomen/GI: Negative for abdominal pain, nausea, vomiting, diarrhea, and constipation,  sb4 

      Constitutional: Positive for fatigue, malaise,                                              

      ENT: Positive for sore throat,                                                              

      Respiratory: Positive for cough,                                                            

      Abdomen/GI: Positive for                                                                    

      All other systems are negative,                                                             

00:55 Constitutional: Positive for fever,                                                     sb4 

                                                                                                  

Exam:                                                                                             

00:56 Head/Face:  Normocephalic, atraumatic. Eyes:  Extra-ocular motions intact.  Periorbital sb4 

      areas with no swelling, redness, or edema. Cardiovascular:  Regular rate and rhythm         

      with a normal S1 and S2. Respiratory:  No increased work of breathing, no retractions       

      or nasal flaring. Abdomen/GI:  Soft, non-tender, no distension. Skin:  Warm, dry with       

      normal turgor.  Normal color with no rashes, no lesions, and no evidence of cellulitis.     

00:56 Constitutional: The patient appears alert, awake, obviously ill,                            

00:56 ENT: Posterior pharynx: Tonsils: bilaterally enlarged, with erythema, no exudate,           

00:56 Respiratory: Breath sounds: wheezing: expiratory is scattered,                              

                                                                                                  

Vital Signs:                                                                                      

                                                                                             

21:55  / 86; Pulse 77; Resp 16; Temp 98.6; Pulse Ox 100% on R/A; Weight 81.65 kg;       al5 

      Height 5 ft. 9 in. ; Pain 7/10;                                                             

23:54  / 73; Pulse 75; Resp 20; Temp 98.6; Pulse Ox 95% on R/A; Pain 0/10;              bm8 

                                                                                             

00:39  / 80; Pulse 94; Resp 20; Temp 98; Pulse Ox 96% on R/A; Pain 0/10;                bm8 

                                                                                             

21:55 Body Mass Index 26.58 (81.65 kg, 175.26 cm)                                             al5 

                                                                                             

21:55 Pain Scale: Adult                                                                       al5 

23:54 Pain Scale: Adult                                                                       bm8 

                                                                                             

00:39 Pain Scale: Adult                                                                       bm8 

                                                                                                  

Delia Coma Score:                                                                               

                                                                                             

23:54 Eye Response: spontaneous(4). Motor Response: obeys commands(6). Verbal Response:       bm8 

      oriented(5). Total: 15.                                                                     

                                                                                                  

MDM:                                                                                              

22:03 Medical Screening Exam initiated                                                        sb4 

                                                                                             

00:57 Data reviewed: vital signs, nurses notes, lab test result(s), radiologic studies, and   sb4 

      as a result, I will discharge patient. Counseling: I had a detailed discussion with the     

      patient and/or guardian regarding the historical points, exam findings, and any             

      diagnostic results supporting the discharge/admit diagnosis, lab results, radiology         

      results, the need for outpatient follow up, for definitive care, to return to the           

      emergency department if symptoms worsen or persist or if there are any questions or         

      concerns that arise at home.                                                                

                                                                                                  

                                                                                             

22:03 Order name: SARS RAPID; Complete Time: 00:12                                            sb4 

                                                                                             

22:03 Order name: Flu; Complete Time: 00:13                                                   sb4 

                                                                                             

22:03 Order name: Strep; Complete Time: 00:12                                                 sb4 

                                                                                             

22:03 Order name: Chest Pa And Lat (2 Views) XRAY; Complete Time: 22:29                       sb4 

                                                                                                  

Administered Medications:                                                                         

                                                                                             

23:53 Drug: Ibuprofen  mg PO once Route: PO;                                            8 

                                                                                             

00:48 Follow up: Response: No adverse reaction                                                8 

                                                                                             

23:53 Drug: DuoNeb Nebulize (3:1) (2.5 mg - 0.5 mg) 3 ml Nebulizer once Route: Nebulizer;     /01                                                                                             

00:47 Follow up: Response: No adverse reaction                                                bm8 

00:33 Drug: Amoxicillin-Clavulanate  mg PO once Route: PO;                              bm8 

00:47 Follow up: Response: Medication administered at discharge.                              bm8 

                                                                                                  

                                                                                                  

Disposition Summary:                                                                              

25 00:23                                                                                    

Discharge Ordered                                                                                 

 Notes:       Location: Home                                                                        
  sb4

      Problem: new                                                                            sb4 

      Symptoms: have improved                                                                 sb4 

      Condition: Stable                                                                       sb4 

      Diagnosis                                                                                   

        - Streptococcal pharyngitis                                                           sb4 

      Followup:                                                                               sb4 

        - With: Private Physician                                                                  

        - When: 1 week                                                                             

        - Reason: Recheck today's complaints, Re-evaluation by your physician                      

      Discharge Instructions:                                                                     

        - Discharge Summary Sheet                                                             sb4 

        - Strep Throat, Adult, Easy-to-Read                                                   sb4 

      Forms:                                                                                      

        - Antibiotic Education                                                                sb4 

        - Patient Portal Instructions                                                         sb4 

        - Leadership Thank You Letter                                                         sb4 

      Prescriptions:                                                                              

        - Amoxicillin 875 mg Oral Tablet                                                           

            - take 1 tablet ORAL route every 12 hours for 10 days; 20 tablet; Refills: 0,     sb4 

      Product Selection Permitted                                                                 

Signatures:                                                                                       

Dispatcher [employer]                           EDMS                                                 

Rula Nixon PA-C PA-C sb4                                                  

Donald Hernández, RN                      RN   bm8                                                  

Audrey Glover RN                   RN   al5                                                  

                                                                                                  

Corrections: (The following items were deleted from the chart)                                    

                                                                                             

21:57 21:57 Allergies: NKA; al5                                                               al5 

22:04 22:04 SARS-COV-2 Antigen Rapid+I.LAB.BRZ ordered. [facility]

22:04 22:04 Influenza Screen (A \T\ B)+BA.LAB.BRZ ordered. [facility]

22:04 22:04 Group A Streptococcus Rapid Sc+BA.LAB.BRZ ordered. [facility]

                                                                                             

00:55 00:51 patient reports feeling poorly for 2 days now- cough, sob, malaise, sore throat.  sb4 

      denies any sick contacts. has history of asthma, uses an inhaler intermittently. no         

      n/v/d, fever, or chills. sb4                                                                

00:56 00:51 patient reports feeling poorly for 2 days now- cough, sob, malaise, sore throat.  sb4 

      denies any sick contacts. has history of asthma, uses an inhaler intermittently. does       

      endorse some nausea, and vomiting. denies fever, or chills. sb4                             

                                                                                                  

**************************************************************************************************

## 2025-03-27 ENCOUNTER — HOSPITAL ENCOUNTER (EMERGENCY)
Dept: HOSPITAL 97 - ER | Age: 30
LOS: 1 days | Discharge: HOME | End: 2025-03-28
Payer: COMMERCIAL

## 2025-03-27 DIAGNOSIS — J45.901: Primary | ICD-10-CM

## 2025-03-27 DIAGNOSIS — E83.42: ICD-10-CM

## 2025-03-27 LAB
ALBUMIN SERPL BCP-MCNC: 3.5 G/DL (ref 3.4–5)
ALBUMIN/GLOB SERPL: 1.1 {RATIO} (ref 1.1–1.8)
ALP SERPL-CCNC: 231 U/L (ref 45–117)
ALT SERPL W P-5'-P-CCNC: 109 U/L (ref 16–61)
ANION GAP SERPL CALC-SCNC: 9 MEQ/L (ref 5–15)
AST SERPL W P-5'-P-CCNC: 81 U/L (ref 15–37)
BILIRUB INDIRECT SERPL-MCNC: 0.3 MG/DL (ref 0.2–0.8)
BUN BLD-MCNC: 16 MG/DL (ref 7–18)
GLOBULIN SER CALC-MCNC: 3.3 G/DL (ref 2.3–3.5)
GLUCOSE SERPLBLD-MCNC: 105 MG/DL (ref 74–106)
HCT VFR BLD CALC: 37.5 % (ref 39.6–49)
HGB BLD-MCNC: 13.3 G/DL (ref 13.6–17.9)
LYMPHOCYTES # SPEC AUTO: 1.8 K/UL (ref 0.7–4.9)
MAGNESIUM SERPL-MCNC: 1.3 MG/DL (ref 1.6–2.4)
MCH RBC QN AUTO: 31.8 PG (ref 27–35)
MCHC RBC AUTO-ENTMCNC: 35.5 G/DL (ref 32–36)
MCV RBC: 89.5 FL (ref 80–100)
NRBC # BLD: 0 10*3/UL (ref 0–0)
NRBC BLD AUTO-RTO: 0.1 % (ref 0–0)
PMV BLD: 8.8 FL (ref 7.6–11.3)
POTASSIUM SERPL-SCNC: 3 MEQ/L (ref 3.5–5.1)
RBC # BLD: 4.19 M/UL (ref 4.33–5.43)
TROPONIN I SERPL HS-MCNC: 11.2 PG/ML (ref ?–58.9)
WBC # BLD AUTO: 7.8 THOU/UL (ref 4.3–10.9)

## 2025-03-27 PROCEDURE — 83735 ASSAY OF MAGNESIUM: CPT

## 2025-03-27 PROCEDURE — 36415 COLL VENOUS BLD VENIPUNCTURE: CPT

## 2025-03-27 PROCEDURE — 96374 THER/PROPH/DIAG INJ IV PUSH: CPT

## 2025-03-27 PROCEDURE — 99285 EMERGENCY DEPT VISIT HI MDM: CPT

## 2025-03-27 PROCEDURE — 80076 HEPATIC FUNCTION PANEL: CPT

## 2025-03-27 PROCEDURE — 96375 TX/PRO/DX INJ NEW DRUG ADDON: CPT

## 2025-03-27 PROCEDURE — 96361 HYDRATE IV INFUSION ADD-ON: CPT

## 2025-03-27 PROCEDURE — 84484 ASSAY OF TROPONIN QUANT: CPT

## 2025-03-27 PROCEDURE — 93005 ELECTROCARDIOGRAM TRACING: CPT

## 2025-03-27 PROCEDURE — 76705 ECHO EXAM OF ABDOMEN: CPT

## 2025-03-27 PROCEDURE — 85025 COMPLETE CBC W/AUTO DIFF WBC: CPT

## 2025-03-27 PROCEDURE — 82550 ASSAY OF CK (CPK): CPT

## 2025-03-27 PROCEDURE — 71045 X-RAY EXAM CHEST 1 VIEW: CPT

## 2025-03-27 PROCEDURE — 80048 BASIC METABOLIC PNL TOTAL CA: CPT

## 2025-03-27 NOTE — RAD REPORT
Procedure: Chest Single View



HISTORY: Chest pain



COMPARISON: January 2025



FINDINGS:



The lungs appear clear of acute infiltrate.



No significant pleural effusion noted.



The heart is normal size.



IMPRESSION:



No acute abnormality is displayed.



Reported By: Santy Moore 

Electronically Signed:  3/27/2025 9:54 PM
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
## 2025-03-28 VITALS — OXYGEN SATURATION: 98 % | SYSTOLIC BLOOD PRESSURE: 104 MMHG | DIASTOLIC BLOOD PRESSURE: 68 MMHG

## 2025-03-28 VITALS — TEMPERATURE: 98.4 F

## 2025-03-28 NOTE — EDPHYS
Physician Documentation                                                                           

 [facility]                                                                 

Name: Miles Verma                                                                             

Age: 29 yrs                                                                                       

Sex: Male                                                                                         

: 1995                                                                                   

MRN: [id number]                                                                                   

Arrival Date: 2025                                                                          

Time: 20:33                                                                                       

Account#: [id number]                                                                            

Bed 2                                                                                             

Private MD:                                                                                       

ED Physician Dioni Aguilera                                                                      

HPI:                                                                                              

                                                                                             

21:24 This 29 yrs old Black Male presents to ER via [employer] with complaints of Chest  cp  

      Pain.                                                                                       

21:25 The patient or guardian reports chest pain that is located primarily in the anterior    cp  

      chest wall.                                                                                 

21:25 The pain does not radiate. Associated signs and symptoms: Pertinent positives:          cp  

      shortness of breath, Pertinent negatives: cough, lower extremity pain, lower extremity      

      swelling, palpitations, syncope, vomiting. The chest pain is described as constant.         

      Duration: The patient or guardian reports a single episode, that is still ongoing.          

                                                                                                  

Historical:                                                                                       

- Allergies:                                                                                      

20:39 No Known Drug Allergies;                                                                me1 

- PMHx:                                                                                           

20:39 Asthma;                                                                                 me1 

- PSHx:                                                                                           

20:39 None;                                                                                   me1 

                                                                                                  

- Immunization history:: Adult Immunizations unknown.                                             

- Infectious Disease History:: Denies.                                                            

- Social history:: Smoking status: Reported history of juuling and/or vaping.                     

                                                                                                  

                                                                                                  

ROS:                                                                                              

21:30 Constitutional: Negative for body aches, chills, fever, poor PO intake,                 cp  

21:30 Cardiovascular: Positive for chest pain, Negative for edema, palpitations,              cp  

21:30 Respiratory: Positive for shortness of breath,                                              

21:30 Eyes: Negative for injury, pain, redness, and discharge,                                cp  

21:30 Neck: Negative for pain with movement, pain at rest, stiffness,                             

21:30 Abdomen/GI: Negative for abdominal pain, vomiting, diarrhea, constipation,                  

21:30 Neuro: Negative for altered mental status, dizziness, headache, numbness, syncope, near     

      syncope, weakness,                                                                          

21:30 All other systems are negative,                                                             

                                                                                                  

Exam:                                                                                             

20:45 ECG was reviewed by the Attending Physician.                                            cp  

21:33 Constitutional: The patient appears in no acute distress, alert, awake,                 cp  

      non-diaphoretic, non-toxic, well developed, well nourished,                                 

21:33 Head/Face:  Normocephalic, atraumatic.                                                  cp  

21:33 Eyes: Periorbital structures: appear normal, Conjunctiva: normal, no exudate, no            

      injection, Sclera: no appreciated abnormality, Lids and lashes: appear normal,              

      bilaterally,                                                                                

21:33 ENT: External ear(s): are unremarkable, Nose: is normal, Mouth: Lips: moist, Oral           

      mucosa: moist, Posterior pharynx: Airway: no evidence of obstruction, patent,               

21:33 Neck: ROM/movement: is normal, is supple, without pain, no range of motions                 

      limitations,                                                                                

21:33 Chest/axilla: Inspection: normal, Palpation: crepitus, is not appreciated, tenderness,      

      that is moderate, of the  anterior aspect of right upper chest, anterior aspect of left     

      upper chest and mid-sternal area,                                                           

21:33 Cardiovascular: Rate: normal, Rhythm: regular, Edema: is not appreciated,                   

21:33 Respiratory: the patient does not display signs of respiratory distress,  Respirations:     

      normal, no use of accessory muscles, no retractions, labored breathing, is not present,     

      Breath sounds: are clear throughout, no decreased breath sounds, no stridor, no             

      wheezing,                                                                                   

21:33 Abdomen/GI: Inspection: abdomen appears normal, Palpation: abdomen is soft and              

      non-tender, in all quadrants,                                                               

21:33 Back: pain, is absent, ROM is normal,                                                       

21:33 Neuro: Orientation: to person, place \T\ time. Mentation: is normal,                        

                                                                                                  

Vital Signs:                                                                                      

20:34  / 82; Pulse 67; Resp 15; Temp 98.4; Pulse Ox 99% ; Weight 81.65 kg; Height 5 ft. me1 

      9 in. ; Pain 8/10;                                                                          

21:12  / 99; Pulse 69; Resp 17 S; Pulse Ox 99% on R/A;                                  ha1 

23:15  / 78; Pulse 64; Resp 18 S; Pulse Ox 100% on R/A;                                 ha1 

                                                                                             

00:30  / 69; Pulse 63; Resp 17 S; Pulse Ox 99% on R/A;                                  ha1 

02:12  / 68; Pulse 70; Resp 18; Temp 98.4; Pulse Ox 98% ; Pain 0/10;                    bm8 

                                                                                             

20:34 Body Mass Index 26.58 (81.65 kg, 175.26 cm)                                             me1 

                                                                                             

20:34 Pain Scale: Adult                                                                       me1 

02:12 Pain Scale: Adult                                                                       bm8 

                                                                                                  

Delia Coma Score:                                                                               

02:12 Eye Response: spontaneous(4). Motor Response: obeys commands(6). Verbal Response:       bm8 

      oriented(5). Total: 15.                                                                     

                                                                                                  

MDM:                                                                                              

                                                                                             

20:35 Medical Screening Exam initiated                                                          

23:00 Differential diagnosis: acute myocardial infarction, acute pericarditis, anxiety, chest cp  

      wall pain, cholecystitis, Cholelithiasis costochondritis, esophagitis, pancreatitis,        

      pleurisy, pneumonia, pneumothorax, pulmonary embolus.                                       

                                                                                             

02:20 Data reviewed: vital signs, nurses notes, lab test result(s), EKG, radiologic studies,  cp  

      plain films, and as a result, I will discharge patient.                                     

02:20 I considered the following discharge prescriptions or medication management in the        

      emergency department Medications were administered in the Emergency Department. See MAR.    

02:20 Independent interpretation of the following test(s) in the Emergency Department EKG:    cp  

      See my EKG interpretation above. Counseling: I had a detailed discussion with the           

      patient and/or guardian regarding the historical points, exam findings, and any             

      diagnostic results supporting the discharge/admit diagnosis, lab results, radiology         

      results, to return to the emergency department if symptoms worsen or persist or if          

      there are any questions or concerns that arise at home. Response to treatment: the          

      patient's symptoms have markedly improved after treatment, and as a result, I will          

      discharge patient. Special discussion: Based on the patient's history, exam, and Dx         

      evaluation, there is no indication for emergent intervention or inpatient Tx. It is         

      understood by the patient/guardian that if the Sx's persist or worsen they need to          

      return immediately for re-evaluation.                                                       

                                                                                                  

                                                                                             

21:22 Order name: Basic Metabolic Panel; Complete Time: 23:12                                   

                                                                                             

23:13 Interpretation: Normal except: K 3.0; CO2 33.                                             

                                                                                             

21:22 Order name: CBC with Diff; Complete Time: 22:02                                           

                                                                                             

22:02 Interpretation: Normal except: RBC 4.19; HGB 13.3; HCT 37.5.                              

                                                                                             

21:22 Order name: LFT's; Complete Time: 23:12                                                   

                                                                                             

23:14 Interpretation: Normal except: AST 81; ; .                                  

                                                                                             

21:22 Order name: Magnesium; Complete Time: 23:12                                               

                                                                                             

21:22 Order name: Troponin HS; Complete Time: 23:12                                           cp  

                                                                                             

21:22 Order name: CK; Complete Time: 23:12                                                    cp  

                                                                                             

00:58 Order name: Troponin High Sensitivity; Complete Time: 02:17                             cp  

                                                                                             

21:22 Order name: XRAY Chest (1 view); Complete Time: 22:02                                   cp  

                                                                                             

23:17 Order name: US Abdomen Limited                                                          cp  

                                                                                             

20:35 Order name: EKG; Complete Time: 20:36                                                   cp  

                                                                                             

20:35 Order name: EKG - Nurse/Tech; Complete Time: 20:51                                      cp  

                                                                                             

21:22 Order name: Cardiac monitoring; Complete Time: 21:34                                    cp  

                                                                                             

21:22 Order name: IV Saline Lock; Complete Time: 21:34                                        cp  

                                                                                             

21:22 Order name: Labs collected and sent; Complete Time: 21:34                               cp  

                                                                                             

21:22 Order name: O2 Per Protocol; Complete Time: 21:34                                       cp  

                                                                                             

21:22 Order name: O2 Sat Monitoring; Complete Time: 21:34                                     cp  

                                                                                                  

EC/27                                                                                             

20:45 Rate is 66 beats/min. Rhythm is regular. FL interval is normal. QRS interval is normal. cp  

      QT interval is normal. T waves are Inverted in lead aVR. Interpreted by me. Reviewed by     

      me.                                                                                         

                                                                                                  

Administered Medications:                                                                         

21:40 Drug: MethylPrednisoLONE  mg IVP once Route: IVP; Site: right forearm;           ha1 

22:00 Follow up: Response: No adverse reaction; Marked relief of symptoms                     ha1 

21:44 Drug: Ketorolac IVP 15 mg IVP once Route: IVP; Site: right forearm;                     ha1 

22:05 Follow up: Response: No adverse reaction; Marked relief of symptoms; Pain is decreased  ha1 

21:46 Drug: NS 0.9% IV 1000 ml IV at 1000 ml once; to be given as a bolus over 60 minutes     ha1 

      Route: IV; Rate: 1000 ml; Site: right forearm;                                              

                                                                                             

00:49 Follow up: Response: No adverse reaction; IV Status: Completed infusion; IV Intake:     ha1 

      1000ml                                                                                      

                                                                                             

21:46 Drug: Albuterol Inhalation 2.5 mg Inhalation once Route: Inhalation;                    ha1 

22:00 Follow up: Response: No adverse reaction; Marked relief of symptoms                     1 

                                                                                             

02:27 Drug: Magnesium Oxide PO 1200 mg PO once; administer with meals Route: PO;              bm8 

02:29 Follow up: Response: No adverse reaction                                                bm8 

                                                                                                  

                                                                                                  

Disposition Summary:                                                                              

25 02:20                                                                                    

Discharge Ordered                                                                                 

 Notes:       Location: Home                                                                        
  cp

      Problem: new                                                                            cp  

      Symptoms: have improved                                                                 cp  

      Condition: Stable                                                                       cp  

      Diagnosis                                                                                   

        - Chest pain, unspecified                                                             cp  

        - Hypomagnesemia                                                                      cp  

        - Unspecified asthma with (acute) exacerbation                                        cp  

      Followup:                                                                               cp  

        - With: Private Physician                                                                  

        - When: 2 - 3 days                                                                         

        - Reason: Recheck today's complaints                                                       

      Discharge Instructions:                                                                     

        - Discharge Summary Sheet                                                             cp  

        - Asthma, Adult                                                                       cp  

        - Nonspecific Chest Pain, Adult                                                       cp  

      Forms:                                                                                      

        - Medication Reconciliation Form                                                      cp  

        - Antibiotic Education                                                                cp  

        - Prescription Opioid Use                                                             cp  

        - Patient Portal Instructions                                                         cp  

        - Leadership Thank You Letter                                                         cp  

      Prescriptions:                                                                              

        - albuterol sulfate 90 mcg/actuation Inhalation HFA Aerosol Inhaler                        

            - inhale 1 inhalation INHALATION route every 4 to 6 hours as needed for           cp  

      bronchospasm; administer via ventilator; 1 unit; Refills: 0, Product Selection Permitted    

        - Ibuprofen 800 mg Oral Tablet                                                             

            - take 1 tablet ORAL route every 8 hours As needed take with food; 30 tablet;     cp  

      Refills: 0, Product Selection Permitted                                                     

        - Medrol (Osiel) 4 mg Oral Tablets, Dose Pack                                                

            - take 1 tablet ORAL route as directed - follow package instructions; 1 packet;   cp  

      Refills: 0, Product Selection Permitted                                                     

Signatures:                                                                                       

Dispatcher [employer]                           EDMS                                                 

Dioni Knowles PA PA   cp                                                   

Jesenia Gentile, RN                        RN   ha1                                                  

Vilma White RN                  RN   me1                                                  

Donald Hernández, RN                      RN   bm8                                                  

                                                                                                  

Corrections: (The following items were deleted from the chart)                                    

                                                                                             

20:39 20:39 PSHx: Unable to Obtain; me1                                                       me1 

21:22 21:22 BASIC METABOLIC PANEL+C.LAB.BRZ ordered. EDMS                                     EDMS

21:22 21:22 CBC+H.LAB.BRZ ordered. EDMS                                                       EDMS

21:22 21:22 HEPATIC FUNCTION+C.LAB.BRZ ordered. EDMS                                          EDMS

21:22 21:22 MAGNESIUM+C.LAB.BRZ ordered. EDMS                                                 EDMS

21:22 21:22 Troponin High Sensitivity+C.LAB.BRZ ordered. EDMS                                 EDMS

21:22 21:22 URINE DRUG SCREEN+UC.LAB.BRZ ordered. EDMS                                        EDMS

21:22 21:22 CREATINE PHOSPHOKINASE+C.LAB.BRZ ordered. EDMS                                    EDMS

                                                                                                  

**************************************************************************************************

## 2025-03-28 NOTE — ER
Nurse's Notes                                                                                     

 [facility]                                                                 

Name: Miles Verma                                                                             

Age: 29 yrs                                                                                       

Sex: Male                                                                                         

: 1995                                                                                   

MRN: [id number]                                                                                   

Arrival Date: 2025                                                                          

Time: 20:33                                                                                       

Account#: [id number]                                                                            

Bed 2                                                                                             

Private MD:                                                                                       

Diagnosis: Chest pain, unspecified;Hypomagnesemia;Unspecified asthma with (acute) exacerbation    

                                                                                                  

Presentation:                                                                                     

                                                                                             

20:34 Chief complaint: Patient states: Patient is in custody of [facility], officer at bedside. me1 

      Patient reports chest pain that started about 15 minutes pta while walking. Pain is         

      midsternal, sharp and radiates to his epigastric area. No associated symptoms with          

      pain. Coronavirus screen: Vaccine status: Patient reports being unvaccinated. Ebola         

      Screen: No symptoms or risks identified at this time. Initial Sepsis Screen: Does the       

      patient meet any 2 criteria? No. Patient's initial sepsis screen is negative. Does the      

      patient have a suspected source of infection? No. Patient's initial sepsis screen is        

      negative. Risk Assessment: Do you want to hurt yourself or someone else? Patient            

      reports no desire to harm self or others. Onset of symptoms was 2025.             

20:34 Method Of Arrival: Law Enforcement: Morales PRADHAN                                            [facility] 

20:34 Acuity: JANAE 3                                                                           me1 

                                                                                                  

Triage Assessment:                                                                                

20:39 General: Appears in no apparent distress. Behavior is calm, cooperative, appropriate    me1 

      for age. Pain: Complains of pain in chest Pain radiates to epigastric area Pain             

      currently is 8 out of 10 on a pain scale. Quality of pain is described as sharp, Pain       

      began suddenly, Is intermittent. EENT: No signs and/or symptoms were reported regarding     

      the EENT system. Neuro: Level of Consciousness is awake, alert, obeys commands,             

      Oriented to person, place, time, situation, Appropriate for age. Cardiovascular:            

      Patient's skin is warm and dry. Cardiovascular: Reports chest pain. Respiratory: Airway     

      is patent Trachea midline Respiratory effort is even, unlabored, Respiratory pattern is     

      regular, symmetrical. GI: No signs and/or symptoms were reported involving the              

      gastrointestinal system. : No signs and/or symptoms were reported regarding the           

      genitourinary system. Derm: Skin is intact, is healthy with good turgor, Skin is pink,      

      warm \T\ dry. Musculoskeletal: No signs and/or symptoms reported regarding the              

      musculoskeletal system.                                                                     

                                                                                                  

Historical:                                                                                       

- Allergies:                                                                                      

20:39 No Known Drug Allergies;                                                                me1 

- PMHx:                                                                                           

20:39 Asthma;                                                                                 me1 

- PSHx:                                                                                           

20:39 None;                                                                                   me1 

                                                                                                  

- Immunization history:: Adult Immunizations unknown.                                             

- Infectious Disease History:: Denies.                                                            

- Social history:: Smoking status: Reported history of juuling and/or vaping.                     

                                                                                                  

                                                                                                  

Screenin:41 [facility] ED Fall Risk Assessment (Adult) History of falling in the last 3 months,       me1 

      including since admission No falls in past 3 months (0 pts) Confusion or Disorientation     

      No (0 pts) Intoxicated or Sedated No (0 pts) Impaired Gait No (0 pts) Mobility Assist       

      Device Used No (0 pt) Altered Elimination No (0 pt) Score/Fall Risk Level 0 - 2 = Low       

      Risk Maintained a safe environment, Provided non-skid footwear, Hourly rounding (assess     

      needs \T\ fall precautionary measures) done. Abuse screen: Denies threats or abuse.         

      Nutritional screening: No deficits noted. Tuberculosis screening: No symptoms or risk       

      factors identified.                                                                         

                                                                                                  

Assessment:                                                                                       

20:41 General: See triage assessment. Pain: Complains of pain in chest.                       me1 

21:11 General: Appears comfortable, Behavior is calm, cooperative. Pain: Complains of pain in ha1 

      epigastric area Pain currently is 6 out of 10 on a pain scale. Quality of pain is           

      described as burning. Neuro: Level of Consciousness is awake, alert, obeys commands,        

      Oriented to person, place, time, situation. Cardiovascular: Capillary refill < 3            

      seconds Patient's skin is warm and dry. Rhythm is sinus rhythm.                             

22:20 Reassessment: Patient and/or family updated on plan of care and expected duration. Pain ha1 

      level reassessed. Patient is alert, oriented x 3, equal unlabored respirations, skin        

      warm/dry/pink.                                                                              

23:10 Reassessment: Patient and/or family updated on plan of care and expected duration. Pain ha1 

      level reassessed. Patient is alert, oriented x 3, equal unlabored respirations, skin        

      warm/dry/pink. Patient states feeling better. Patient states symptoms have improved.        

                                                                                             

02:12 Reassessment: Patient appears in no apparent distress at this time. Patient and/or      bm8 

      family updated on plan of care and expected duration. Pain level reassessed. Patient is     

      alert, oriented x 3, equal unlabored respirations, skin warm/dry/pink. pt is resting        

      with eyes closed breathing is even unlabored with symmetrical rise and fall of chest        

      Patient denies pain at this time. Patient states feeling better. Patient states             

      symptoms have improved.                                                                     

                                                                                                  

Vital Signs:                                                                                      

                                                                                             

20:34  / 82; Pulse 67; Resp 15; Temp 98.4; Pulse Ox 99% ; Weight 81.65 kg; Height 5 ft. me1 

      9 in. ; Pain 8/10;                                                                          

21:12  / 99; Pulse 69; Resp 17 S; Pulse Ox 99% on R/A;                                  ha1 

23:15  / 78; Pulse 64; Resp 18 S; Pulse Ox 100% on R/A;                                 ha1 

                                                                                             

00:30  / 69; Pulse 63; Resp 17 S; Pulse Ox 99% on R/A;                                  ha1 

02:12  / 68; Pulse 70; Resp 18; Temp 98.4; Pulse Ox 98% ; Pain 0/10;                    bm8 

                                                                                             

20:34 Body Mass Index 26.58 (81.65 kg, 175.26 cm)                                             me1 

                                                                                             

20:34 Pain Scale: Adult                                                                       me1 

02:12 Pain Scale: Adult                                                                       bm8 

                                                                                                  

[place] Coma Score:                                                                               

02:12 Eye Response: spontaneous(4). Motor Response: obeys commands(6). Verbal Response:       bm8 

      oriented(5). Total: 15.                                                                     

                                                                                                  

ED Course:                                                                                        

                                                                                             

20:34 Patient arrived in ED.                                                                  me1 

20:35 [facility]                                                                cp  

20:35 Dioni Aguilera MD is Attending Physician.                                             cp  

20:39 Triage completed.                                                                       me1 

20:39 Arm band placed on Patient placed in an exam room.                                      me1 

20:41 Patient has correct armband on for positive identification. Bed in low position. Call   [facility] 

      light in reach. Side rails up X2. Provided Education on: POC. Verbalized                    

      understanding.. Client placed on continuous cardiac and pulse oximetry monitoring. NIBP     

      monitoring applied. Cardiac monitor on. Pulse ox on. NIBP on.                               

20:41 No provider procedures requiring assistance completed. Patient maintains SpO2           me1 

      saturation greater than 95% on room air.                                                    

20:51 Vilma White, RN is Primary Nurse.                                                me1 

20:51 EKG done, by ED staff, reviewed by Dioni HEDNRICKS.                                       me1 

20:54 EKG done, by ED staff, reviewed by Dioni HENDRICKS.                                       oe  

21:33 Inserted saline lock: 22 gauge in right forearm, using aseptic technique. Blood         oe  

      collected. Flushed with 10 mL NS.                                                           

21:34 Basic Metabolic Panel Sent.                                                             oe  

21:34 CBC with Diff Sent.                                                                     oe  

21:34 LFT's Sent.                                                                             oe  

21:34 Magnesium Sent.                                                                         oe  

21:34 Troponin HS Sent.                                                                       oe  

21:47 XRAY Chest (1 view) In Process Unspecified.                                             EDMS

                                                                                             

01:03 US Abdomen Limited In Process Unspecified.                                              EDMS

02:29 IV discontinued, intact, bleeding controlled, No redness/swelling at site. Pressure     bm8 

      dressing applied.                                                                           

                                                                                                  

Administered Medications:                                                                         

                                                                                             

21:40 Drug: MethylPrednisoLONE  mg IVP once Route: IVP; Site: right forearm;           ha1 

22:00 Follow up: Response: No adverse reaction; Marked relief of symptoms                     ha1 

21:44 Drug: Ketorolac IVP 15 mg IVP once Route: IVP; Site: right forearm;                     ha1 

22:05 Follow up: Response: No adverse reaction; Marked relief of symptoms; Pain is decreased  ha1 

21:46 Drug: NS 0.9% IV 1000 ml IV at 1000 ml once; to be given as a bolus over 60 minutes     ha1 

      Route: IV; Rate: 1000 ml; Site: right forearm;                                              

                                                                                             

00:49 Follow up: Response: No adverse reaction; IV Status: Completed infusion; IV Intake:     ha1 

      1000ml                                                                                      

                                                                                             

21:46 Drug: Albuterol Inhalation 2.5 mg Inhalation once Route: Inhalation;                    ha1 

22:00 Follow up: Response: No adverse reaction; Marked relief of symptoms                     ha1 

                                                                                             

02:27 Drug: Magnesium Oxide PO 1200 mg PO once; administer with meals Route: PO;              bm8 

02:29 Follow up: Response: No adverse reaction                                                bm8 

                                                                                                  

                                                                                                  

Medication:                                                                                       

                                                                                             

20:41 VIS not applicable for this client.                                                     me1 

                                                                                                  

Intake:                                                                                           

                                                                                             

00:49 IV: 1000ml; Total: 1000ml.                                                              ha1 

                                                                                                  

Outcome:                                                                                          

02:20 Discharge ordered by MD.                                                                cp  

02:29 Discharged to Law Enforcement                                                           bm8 

02:29 Condition: stable                                                                           

02:29 Discharge instructions given to patient, police, Instructed on discharge instructions,      

      follow up and referral plans. medication usage, safety practices, Demonstrated              

      understanding of instructions, follow-up care, medications, Prescriptions given X 3,        

02:30 Patient left the ED.                                                                    bm8 

                                                                                                  

Signatures:                                                                                       

Dispatcher [employer]                           EDMS                                                 

Dioni Knowles PA PA cp Espinosa, Orlando oe Ayala, Heidy, RN                        RN   ha1                                                  

Vilma White RN                  RN   me1                                                  

Donald Hernández RN                      RN   bm8                                                  

                                                                                                  

Corrections: (The following items were deleted from the chart)                                    

                                                                                             

20:39 20:39 PSHx: Unable to Obtain; me1                                                       [facility] 

                                                                                                  

**************************************************************************************************

## 2025-03-28 NOTE — RAD REPORT
CLINICAL HISTORY:   Elevated liver enzymes.



COMPARISON: None.



TECHNIQUE: US ABDOMEN LIMITED 3/27/2025 11:17 PM CDT



FINDINGS:



Liver is slightly nodular in contour. Gallbladder is normally distended without wall thickening, gall
stones or pericholecystic fluid. Common bile duct measures 3 mm.



IMPRESSION:



Slightly nodular hepatic contour.



Electronically signed by:   Carson Stephens MD   03/28/2025 01:39 AM CDT RP Workstation: [id number]



Due to temporary technical issues with the PACS/[employer] reporting system, reports are being dewey
d by the in-house radiologist without review as a courtesy to ensure prompt reporting the

interpreting radiologist is fully responsible for the content of the report.



Transcribed Date/Time: 3/28/2025 7:10 AM



Reported By: Jose Rodriguez 

Electronically Signed:  3/29/2025 3:03 PM

## 2025-03-31 NOTE — EKG
Test Date:    2025-03-27               Test Time:    20:39:46

Technician:   RAJIV                                     

                                                     

MEASUREMENT RESULTS:                                       

Intervals:                                           

Rate:         66                                     

DC:           166                                    

QRSD:         100                                    

QT:           396                                    

QTc:          415                                    

Axis:                                                

P:            39                                     

DC:           166                                    

QRS:          41                                     

T:            47                                     

                                                     

INTERPRETIVE STATEMENTS:                                       

                                                     

Normal sinus rhythm with sinus arrhythmia

Normal ECG

Compared to ECG 12/13/2024 14:11:31

Sinus bradycardia no longer present



Electronically Signed On 03-31-25 11:23:22 CDT by Toi Solorzano